# Patient Record
Sex: FEMALE | Race: BLACK OR AFRICAN AMERICAN | NOT HISPANIC OR LATINO | Employment: OTHER | URBAN - METROPOLITAN AREA
[De-identification: names, ages, dates, MRNs, and addresses within clinical notes are randomized per-mention and may not be internally consistent; named-entity substitution may affect disease eponyms.]

---

## 2017-01-03 ENCOUNTER — PATIENT MESSAGE (OUTPATIENT)
Dept: GASTROENTEROLOGY | Facility: CLINIC | Age: 57
End: 2017-01-03

## 2017-01-10 ENCOUNTER — OFFICE VISIT (OUTPATIENT)
Dept: ORTHOPEDICS | Facility: CLINIC | Age: 57
End: 2017-01-10
Payer: COMMERCIAL

## 2017-01-10 VITALS
DIASTOLIC BLOOD PRESSURE: 77 MMHG | HEART RATE: 62 BPM | BODY MASS INDEX: 22.73 KG/M2 | SYSTOLIC BLOOD PRESSURE: 158 MMHG | WEIGHT: 150 LBS | HEIGHT: 68 IN

## 2017-01-10 DIAGNOSIS — G89.29 CHRONIC PAIN OF BOTH SHOULDERS: Primary | ICD-10-CM

## 2017-01-10 DIAGNOSIS — M25.511 CHRONIC PAIN OF BOTH SHOULDERS: Primary | ICD-10-CM

## 2017-01-10 DIAGNOSIS — M25.512 CHRONIC PAIN OF BOTH SHOULDERS: Primary | ICD-10-CM

## 2017-01-10 PROCEDURE — 99999 PR PBB SHADOW E&M-EST. PATIENT-LVL III: CPT | Mod: PBBFAC,,, | Performed by: ORTHOPAEDIC SURGERY

## 2017-01-10 PROCEDURE — 3078F DIAST BP <80 MM HG: CPT | Mod: S$GLB,,, | Performed by: ORTHOPAEDIC SURGERY

## 2017-01-10 PROCEDURE — 1159F MED LIST DOCD IN RCRD: CPT | Mod: S$GLB,,, | Performed by: ORTHOPAEDIC SURGERY

## 2017-01-10 PROCEDURE — 3077F SYST BP >= 140 MM HG: CPT | Mod: S$GLB,,, | Performed by: ORTHOPAEDIC SURGERY

## 2017-01-10 PROCEDURE — 99203 OFFICE O/P NEW LOW 30 MIN: CPT | Mod: S$GLB,,, | Performed by: ORTHOPAEDIC SURGERY

## 2017-01-10 NOTE — PROGRESS NOTES
Past Medical History   Diagnosis Date    Arthritis     Hypertension     Thyroid disease        Past Surgical History   Procedure Laterality Date    Tubal ligation         Current Outpatient Prescriptions   Medication Sig    benazepril-hydrochlorthiazide (LOTENSIN HCT) 20-25 mg Tab Take 2 tablets by mouth once daily.    cyanocobalamin 1,000 mcg/mL injection Inject 1 mL (1,000 mcg total) into the skin As instructed. 1000 mcg (1 ml) subcutaneous injection daily x 5 days, then weekly x 4 weeks, then monthly thereafter (Patient taking differently: Inject 1,000 mcg into the skin every 30 days. )    ergocalciferol (VITAMIN D2) 50,000 unit Cap Take 1 capsule (50,000 Units total) by mouth every 14 (fourteen) days. Twice a month    etodolac (LODINE) 300 MG Cap Take 1 capsule (300 mg total) by mouth every 8 (eight) hours as needed.    levothyroxine (SYNTHROID) 150 MCG tablet Take 150 mcg by mouth once daily.     No current facility-administered medications for this visit.        Review of patient's allergies indicates:  No Known Allergies    Family History   Problem Relation Age of Onset    Hypertension Father     Stomach cancer Father     Diabetes Father     Diabetes Maternal Grandmother     Hypertension Maternal Grandmother     Stomach cancer Maternal Aunt     Stomach cancer Paternal Aunt     Breast cancer Paternal Aunt        Social History     Social History    Marital status:      Spouse name: N/A    Number of children: N/A    Years of education: N/A     Occupational History    Not on file.     Social History Main Topics    Smoking status: Never Smoker    Smokeless tobacco: Never Used    Alcohol use No    Drug use: No    Sexual activity: Yes     Partners: Male     Other Topics Concern    Not on file     Social History Narrative       Chief Complaint:   Chief Complaint   Patient presents with    Shoulder Pain     Bilateral shoulder pain       Consulting Physician: Referral,  "Self    History of present illness:    This is a 56 y.o. year old female who complains of bilateral shoulder pain that she localizes to her scapula.  She states that the shoulders themselves do not hurt.  She had a fall but says that this pain is not related to that.  She said she had the pain prior to the fall.  She reports that she has a neurological issue which causes her to have bilateral upper extremity weakness.    Review of Systems:    Constitution: Denies chills, fever, and sweats.  HENT: Denies headaches or blurry vision.  Cardiovascular: Denies chest pain or irregular heart beat.  Respiratory: Denies cough or shortness of breath.  Gastrointestinal: Denies abdominal pain, nausea, or vomiting.  Musculoskeletal:  Denies muscle cramps.  Neurological: Denies dizziness or focal weakness.  Psychiatric/Behavioral: Normal mental status.  Hematologic/Lymphatic: Denies bleeding problem or easy bruising/bleeding.  Skin: Denies rash or suspicious lesions.    Examination:    Vital Signs:    Vitals:    01/10/17 1503   BP: (!) 158/77   Pulse: 62   Weight: 68 kg (150 lb)   Height: 5' 8" (1.727 m)   PainSc:   6   PainLoc: Shoulder       Body mass index is 22.81 kg/(m^2).    This a well-developed, well nourished patient in no acute distress.    Alert and oriented and cooperative to examination.       Physical Exam: Right Shoulder Exam     Skin  Rash:   None  Scars:   None    Inspection/Observation  Swelling:   None  Erythema:   None  Bruising:   None  Wounds:   None  Scapular Winging:  None  Scapular Dyskinesia:  yes  Atrophy:   yes  Masses:   None  Lymphadenopathy: None    Tenderness   AC Joint:   None  Medial scapular border    Range of Motion   Active Forward Flexion:  30  Active External Rotation:  5  Active Internal Rotation: hip    Passive Forward Flexion:  180  Passive External Rotation: >45  Passive Internal Rotation at 90 degrees: Normal    Muscle Strength   Forward Flexion:  2/5  External Rotation: 2/5  Internal " Rotation:  3/5    Instability:  None    Other   Sensation:  Normal  Pulse:    2+ radial      Left Shoulder Exam     Skin  Rash:   None  Scars:   None    Inspection/Observation   Swelling:   None  Erythema:   None  Bruising:   None  Wounds:   None  Scapular Winging:  None  Scapular Dyskinesia:  yes  Atrophy:   yes  Masses:   None  Lymphadenopathy: None    Tenderness   AC Joint:   None  Medial scapular border    Range of Motion   Active Forward Flexion:  30  Active External Rotation:  5  Active Internal Rotation: Hip    Passive Forward Flexion:  180  Passive External Rotation: >45  Passive Internal Rotation at 90 degrees: Normal    Muscle Strength   Forward Flexion:  2/5  External Rotation: 2/5  Internal Rotation:  3/5    Instability:  None    Other   Sensation:  Normal  Pulse:    2+ radial          Imaging: X-rays ordered and reviewed today of both shoulders reveal some mild degenerative changes of the acromioclavicular joint but are otherwise normal.        Assessment: Chronic pain of both shoulders        Plan:  She has scapular dyskinesia and muscle atrophy related to some sort of neurological process.  She is currently being seen by a neurologist and has an appointment at [facility].  Her shoulders themselves do not actually her and I think that the lack of normal control of her scapula as well as causing her to have pain around the medial borders.  I've told her to keep her neurological appointments and if she needs any sort of assistance to please give us a call      DISCLAIMER: This note may have been dictated using voice recognition software and may contain grammatical errors.     NOTE: Consult report sent to referring provider via [employer].

## 2017-01-10 NOTE — MR AVS SNAPSHOT
[facility]  [place] [address]  Phone: [phone]                  Reta Styles   1/10/2017 3:15 PM   Office Visit    Description:  Female : 1960   Provider:  Rickie Contreras MD   Department:  [facility]           Reason for Visit     Shoulder Pain           Diagnoses this Visit        Comments    Chronic pain of both shoulders    -  Primary            To Do List           Future Appointments        Provider Department Dept Phone    2017 2:40 PM Cecile Wier MD [facility] [phone]      Goals (5 Years of Data)     None      [facility]     [facility] -  Assistance  Registered nurses in the [facility] provide clinical advisement, health education, appointment booking, and other advisory services.  Call for this free service at [phone].             Medications           Message regarding Medications     Verify the changes and/or additions to your medication regime listed below are the same as discussed with your clinician today.  If any of these changes or additions are incorrect, please notify your healthcare provider.             Verify that the below list of medications is an accurate representation of the medications you are currently taking.  If none reported, the list may be blank. If incorrect, please contact your healthcare provider. Carry this list with you in case of emergency.           Current Medications     benazepril-hydrochlorthiazide (LOTENSIN HCT) 20-25 mg Tab Take 2 tablets by mouth once daily.    cyanocobalamin 1,000 mcg/mL injection Inject 1 mL (1,000 mcg total) into the skin As instructed. 1000 mcg (1 ml) subcutaneous injection daily x 5 days, then weekly x 4 weeks, then monthly thereafter    ergocalciferol (VITAMIN D2) 50,000 unit Cap Take 1 capsule (50,000 Units total) by mouth every 14 (fourteen) days. Twice a month    etodolac (LODINE) 300 MG  "Cap Take 1 capsule (300 mg total) by mouth every 8 (eight) hours as needed.    levothyroxine (SYNTHROID) 150 MCG tablet Take 150 mcg by mouth once daily.           Clinical Reference Information           Vital Signs - Last Recorded  Most recent update: 1/10/2017  3:51 PM by Courtney Bills LPN    BP Pulse Ht Wt BMI    (!) 158/77 62 5' 8" (1.727 m) 68 kg (150 lb) 22.81 kg/m2      Blood Pressure          Most Recent Value    BP  (!)  158/77      Allergies as of 1/10/2017     No Known Allergies      Immunizations Administered on Date of Encounter - 1/10/2017     None      "

## 2017-04-17 ENCOUNTER — HISTORICAL (OUTPATIENT)
Dept: ADMINISTRATIVE | Facility: HOSPITAL | Age: 57
End: 2017-04-17

## 2017-06-07 ENCOUNTER — DOCUMENTATION ONLY (OUTPATIENT)
Dept: FAMILY MEDICINE | Facility: CLINIC | Age: 57
End: 2017-06-07

## 2017-06-07 NOTE — PROGRESS NOTES
Pre-Visit Chart Review  For Appointment Scheduled on 6/13/17.    Health Maintenance Due   Topic Date Due    Pap Smear with HPV Cotest  05/16/1979    Mammogram  05/02/2017

## 2017-06-13 ENCOUNTER — OFFICE VISIT (OUTPATIENT)
Dept: FAMILY MEDICINE | Facility: CLINIC | Age: 57
End: 2017-06-13
Payer: COMMERCIAL

## 2017-06-13 VITALS
HEIGHT: 68 IN | SYSTOLIC BLOOD PRESSURE: 132 MMHG | WEIGHT: 138.44 LBS | BODY MASS INDEX: 20.98 KG/M2 | TEMPERATURE: 98 F | HEART RATE: 73 BPM | DIASTOLIC BLOOD PRESSURE: 100 MMHG

## 2017-06-13 DIAGNOSIS — I10 ESSENTIAL HYPERTENSION: Primary | ICD-10-CM

## 2017-06-13 DIAGNOSIS — E21.5 PARATHYROID DISEASE: ICD-10-CM

## 2017-06-13 PROCEDURE — 99214 OFFICE O/P EST MOD 30 MIN: CPT | Mod: S$GLB,,, | Performed by: FAMILY MEDICINE

## 2017-06-13 PROCEDURE — 99999 PR PBB SHADOW E&M-EST. PATIENT-LVL III: CPT | Mod: PBBFAC,,, | Performed by: FAMILY MEDICINE

## 2017-06-13 RX ORDER — LEVOTHYROXINE SODIUM 150 MCG
150 TABLET ORAL
COMMUNITY
End: 2017-10-09 | Stop reason: DRUGHIGH

## 2017-06-13 RX ORDER — LEVOTHYROXINE SODIUM 175 UG/1
175 TABLET ORAL DAILY
COMMUNITY

## 2017-06-13 NOTE — PATIENT INSTRUCTIONS
Call Pinky (nurse) or Margarita ADRIAN) if we need to schedule you for pre-operative examination.   Call aft er surgery to schedule nurse BP check if BP not at goal (less than 140/90)        Controlling High Blood Pressure  High blood pressure (hypertension) is often called the silent killer. This is because many people who have it dont know it. High blood pressure is defined as 140/90 mm Hg or higher. Know your blood pressure and remember to check it regularly. Doing so can save your life. Here are some things you can do to help control your blood pressure.    Choose heart-healthy foods  · Select low-salt, low-fat foods. Limit sodium intake to 2,400 mg per day or the amount suggested by your healthcare provider.  · Limit canned, dried, cured, packaged, and fast foods. These can contain a lot of salt.  · Eat 8 to 10 servings of fruits and vegetables every day.  · Choose lean meats, fish, or chicken.  · Eat whole-grain pasta, brown rice, and beans.  · Eat 2 to 3 servings of low-fat or fat-free dairy products.  · Ask your doctor about the DASH eating plan. This plan helps reduce blood pressure.  · When you go to a restaurant, ask that your meal be prepared with no added salt.  Maintain a healthy weight  · Ask your healthcare provider how many calories to eat a day. Then stick to that number.  · Ask your healthcare provider what weight range is healthiest for you. If you are overweight, a weight loss of only 3% to 5% of your body weight can help lower blood pressure. Generally, a good weight loss goal is to lose 10% of your body weight in a year.  · Limit snacks and sweets.  · Get regular exercise.  Get up and get active  · Choose activities you enjoy. Find ones you can do with friends or family. This includes bicycling, dancing, walking, and jogging.  · Park farther away from building entrances.  · Use stairs instead of the elevator.  · When you can, walk or bike instead of driving.  · Davis leaves, garden, or do  household repairs.  · Be active at a moderate to vigorous level of physical activity for at least 40 minutes for a minimum of 3 to 4 days a week.   Manage stress  · Make time to relax and enjoy life. Find time to laugh.  · Communicate your concerns with your loved ones and your healthcare provider.  · Visit with family and friends, and keep up with hobbies.  Limit alcohol and quit smoking  · Men should have no more than 2 drinks per day.  · Women should have no more than 1 drink per day.  · Talk with your healthcare provider about quitting smoking. Smoking significantly increases your risk for heart disease and stroke. Ask your healthcare provider about community smoking cessation programs and other options.  Medicines  If lifestyle changes arent enough, your healthcare provider may prescribe high blood pressure medicine. Take all medicines as prescribed. If you have any questions about your medicines, ask your healthcare provider before stopping or changing them.   Date Last Reviewed: 4/27/2016  © 0985-0650 [employer] [address] [place] [address], [place] [address]. All rights reserved. This information is not intended as a substitute for professional medical care. Always follow your healthcare professional's instructions.

## 2017-06-13 NOTE — PROGRESS NOTES
CHIEF COMPLAINT:  Follow up    HISTORY OF PRESENT ILLNESS:  Reta Styles is a 57 y.o. female who presents to clinic for follow up.  She has hypothyroidism, vitamin D deficiency and follows up with Dr. Rahman. She was also found to have parathyroid disease and is meeting with Dr. Le at [facility] on Friday to discuss surgery.  She has HTN and is on lotensin HCT but states that she forgot to take her medication yesterday. She states that her blood pressure is usually in the 130/80-90s.       REVIEW OF SYSTEMS:  The patient denies any fever, chills, night sweats, headaches, vision changes, difficulty speaking or swallowing, decreased hearing, chest pain, palpitations, shortness of breath, cough, nausea, vomiting, abdominal pain, dysuria, diarrhea, constipation, hematuria, hematochezia, melena, changes in her hair, skin, nails, , erythema,swelling over any of her joints, myalgia, swollen glands, easy bruising, fatigue, edema    MEDICATIONS:   Reviewed and/or reconciled in EPIC    ALLERGIES:  Reviewed and/or reconciled in [facility]    PAST MEDICAL/SURGICAL HISTORY:   Past Medical History:   Diagnosis Date    Arthritis     Hypertension     Thyroid disease       Past Surgical History:   Procedure Laterality Date    TUBAL LIGATION         FAMILY HISTORY:    Family History   Problem Relation Age of Onset    Hypertension Father     Stomach cancer Father     Diabetes Father     Diabetes Maternal Grandmother     Hypertension Maternal Grandmother     Stomach cancer Maternal Aunt     Stomach cancer Paternal Aunt     Breast cancer Paternal Aunt        SOCIAL HISTORY:    Social History     Social History    Marital status:      Spouse name: N/A    Number of children: N/A    Years of education: N/A     Occupational History    Not on file.     Social History Main Topics    Smoking status: Never Smoker    Smokeless tobacco: Never Used    Alcohol use No    Drug use: No    Sexual activity: Yes     Partners:  "Male     Other Topics Concern    Not on file     Social History Narrative    No narrative on file       PHYSICAL EXAM:  VITAL SIGNS:   Vitals:    06/13/17 1435 06/13/17 1502   BP: (!) 148/103 (!) 132/100   BP Location: Right arm Right arm   Patient Position: Sitting Sitting   BP Method: Automatic Manual   Pulse: 73    Temp: 98 °F (36.7 °C)    TempSrc: Oral    Weight: 62.8 kg (138 lb 7.2 oz)    Height: 5' 8" (1.727 m)      GENERAL:  Patient appears well nourished, sitting on exam table, in no acute distress.  HEENT:  Atraumatic, normocephalic, PERRLA, EOMI, no conjunctival injection, sclerae are anicteric, normal external auditory canals,TMs clear b/l, gross hearing intact to whisper, MMM, no oropharygneal erythema or exudate.  NECK:  Supple, normal ROM, trachea is midline , no supraclavicular or cervical LAD or masses palpated.  Thyroid gland not palpable.  CARDIOVASCULAR:  RRR, normal S1 and S2, no m/r/g.  RESPIRATORY:  CTA b/l, no wheezes, rhonchi, rales.  No increased work of breathing, no  use of accessory muscles.  ABDOMEN:  Soft, nontender, nondistended, normoactive bowel sounds in all four quadrants, no rebound or guarding, no HSM or masses palpated.  Normal percussion.  EXTREMITIES:  2+ DP pulses b/l, no edema.  SKIN:  Warm, no lesions on exposed skin.  NEUROMUSCULAR:  Cranial nerves II-XII grossly intact. There is tenderness over paraspinal muscles in the thoracic region.  No clubbing or cyanosis of digits/nails.  Steady gait.  PSYCH:  Patient is alert and oriented to person, time, place. They are appropriately dressed and groomed. There is normal eye contact. Rate and tone of speech is normal. Normal insight, judgement. Normal thought content and process.       ASSESSMENT/PLAN: This is a 57 y.o. female who presents to clinic for evaluation of the following concerns  1.  HTN: see below  2. Parathyroid disease: follow up with Dr. Le she will let us know if she needs a preoperative " examination      Patient readiness: acceptance and barriers:none    During the course of the visit the patient was educated and counseled about the following:     Hypertension:   Discussed BP monitoring, will schedule nurse BP recheck after surgery if surgeon does not require stricter BP control.   Underweight:  See above    Goals: Hypertension: Reduce Blood Pressure and Underweight: Increase calorie intake and BMI      Did patient meet goals/outcomes: No    The following self management tools provided: declined    Patient Instructions (the written plan) was given to the patient/family.     Time spent with patient: 30 minutes        Cecile Weir MD

## 2017-06-22 ENCOUNTER — TELEPHONE (OUTPATIENT)
Dept: FAMILY MEDICINE | Facility: CLINIC | Age: 57
End: 2017-06-22

## 2017-06-22 NOTE — TELEPHONE ENCOUNTER
Patient has complaints of nausea stomach aches dizziness patient has been ignoring symptoms and is requesting blood pressure can be changed to see if her symptoms stop patient,patient does not have any swelling patient is on diuretic for blood pressure

## 2017-06-22 NOTE — TELEPHONE ENCOUNTER
----- Message from Davian Barba sent at 6/22/2017  8:14 AM CDT -----  Contact: same  Patient called in and requested a message be sent regarding changing her blood pressure meds.  Patient stated she is having some side affects.    Patient call back number is [phone]

## 2017-06-22 NOTE — TELEPHONE ENCOUNTER
So her lotensin-HCTZ is not a new medication, it is unlikely that it would be causing nausea now. How long have her symptoms been going on?  Are they there all the time?

## 2017-06-23 RX ORDER — AMLODIPINE BESYLATE 5 MG/1
5 TABLET ORAL DAILY
Qty: 30 TABLET | Refills: 11 | Status: SHIPPED | OUTPATIENT
Start: 2017-06-23 | End: 2017-07-06 | Stop reason: SDUPTHER

## 2017-06-23 NOTE — TELEPHONE ENCOUNTER
Stop lisinopril-HCTZ, start norvasc 5 mg daily. Call or e-mail if BP not at goal, less than 140/90, or she has any side effects.

## 2017-06-23 NOTE — TELEPHONE ENCOUNTER
Patient states she has been having nausea since starting medication and just ignored the symptoms ans figure it had nothing to do with the medication and decided to look up the side effect patient states the nausea,cough,dizziness is off and on.patient states she wants a new bp medication to see if her side effects stopped

## 2017-06-23 NOTE — TELEPHONE ENCOUNTER
----- Message from Marry Call sent at 6/23/2017  3:51 PM CDT -----  Call pt at [phone]  Asking for an update on a change for blood pressure medication

## 2017-07-03 ENCOUNTER — TELEPHONE (OUTPATIENT)
Dept: FAMILY MEDICINE | Facility: CLINIC | Age: 57
End: 2017-07-03

## 2017-07-03 RX ORDER — SCOLOPAMINE TRANSDERMAL SYSTEM 1 MG/1
1 PATCH, EXTENDED RELEASE TRANSDERMAL
Qty: 4 PATCH | Refills: 1 | Status: SHIPPED | OUTPATIENT
Start: 2017-07-03 | End: 2017-10-09

## 2017-07-03 NOTE — TELEPHONE ENCOUNTER
Received message from  in his My Chart account. They are going on a cruise and want to get patches for seasickness. Please advise. Thanks, Margarita

## 2017-07-06 ENCOUNTER — TELEPHONE (OUTPATIENT)
Dept: FAMILY MEDICINE | Facility: CLINIC | Age: 57
End: 2017-07-06

## 2017-07-06 RX ORDER — AMLODIPINE BESYLATE 10 MG/1
10 TABLET ORAL DAILY
Qty: 30 TABLET | Refills: 11 | Status: SHIPPED | OUTPATIENT
Start: 2017-07-06 | End: 2017-09-21 | Stop reason: SDUPTHER

## 2017-07-06 NOTE — TELEPHONE ENCOUNTER
Patient states he bp has been running higher than 140/90 patient does not ,patient states the medication has helped a lot with her dizziness and nausea.

## 2017-07-06 NOTE — TELEPHONE ENCOUNTER
----- Message from Susannah Marroquin sent at 7/6/2017  9:13 AM CDT -----  Patient states amlodipine has her BP readings at 145/92, please call to ramon, [phone]

## 2017-07-20 DIAGNOSIS — Z12.31 OTHER SCREENING MAMMOGRAM: ICD-10-CM

## 2017-07-21 ENCOUNTER — TELEPHONE (OUTPATIENT)
Dept: FAMILY MEDICINE | Facility: CLINIC | Age: 57
End: 2017-07-21

## 2017-07-21 ENCOUNTER — CLINICAL SUPPORT (OUTPATIENT)
Dept: FAMILY MEDICINE | Facility: CLINIC | Age: 57
End: 2017-07-21
Payer: COMMERCIAL

## 2017-07-21 VITALS — SYSTOLIC BLOOD PRESSURE: 150 MMHG | DIASTOLIC BLOOD PRESSURE: 98 MMHG

## 2017-07-21 RX ORDER — VALSARTAN 80 MG/1
80 TABLET ORAL DAILY
Qty: 90 TABLET | Refills: 3 | Status: SHIPPED | OUTPATIENT
Start: 2017-07-21 | End: 2018-09-12 | Stop reason: CLARIF

## 2017-07-21 NOTE — PROGRESS NOTES
Patient came to clinic for bp check,automatic 145/100 pulse 82,  Pt machine 153/106 pulse 85,manual 150/98. Takes medication as prescribed. Home readings  7/7  149/91  7/8  134/91  7/9 143/90  7/10 145/91   7/12 139/96 7/18 137/92 7/19 137/90.

## 2017-07-21 NOTE — TELEPHONE ENCOUNTER
BP not at goal. Needs to have second medication added. Am adding diovan, needs to monitor BP and have clinic nurse check in 3 weeks

## 2017-07-21 NOTE — TELEPHONE ENCOUNTER
Notified patient of message. Patient is having surgery in a few weeks and will call after surgery to schedule bp check.

## 2017-09-21 RX ORDER — AMLODIPINE BESYLATE 10 MG/1
10 TABLET ORAL DAILY
Qty: 90 TABLET | Refills: 3 | Status: SHIPPED | OUTPATIENT
Start: 2017-09-21 | End: 2017-10-09

## 2017-10-04 ENCOUNTER — DOCUMENTATION ONLY (OUTPATIENT)
Dept: FAMILY MEDICINE | Facility: CLINIC | Age: 57
End: 2017-10-04

## 2017-10-04 NOTE — PROGRESS NOTES
Pre-Visit Chart Review  For Appointment Scheduled on 10/9/17.    Health Maintenance Due   Topic Date Due    Pap Smear with HPV Cotest  05/16/1979    Mammogram  05/02/2017    Influenza Vaccine  08/01/2017

## 2017-10-09 ENCOUNTER — OFFICE VISIT (OUTPATIENT)
Dept: FAMILY MEDICINE | Facility: CLINIC | Age: 57
End: 2017-10-09
Payer: COMMERCIAL

## 2017-10-09 VITALS
HEART RATE: 84 BPM | BODY MASS INDEX: 20.11 KG/M2 | DIASTOLIC BLOOD PRESSURE: 86 MMHG | WEIGHT: 132.69 LBS | SYSTOLIC BLOOD PRESSURE: 130 MMHG | TEMPERATURE: 98 F | HEIGHT: 68 IN

## 2017-10-09 DIAGNOSIS — R10.13 EPIGASTRIC ABDOMINAL PAIN: Primary | ICD-10-CM

## 2017-10-09 DIAGNOSIS — R14.0 ABDOMINAL BLOATING: ICD-10-CM

## 2017-10-09 DIAGNOSIS — I10 ESSENTIAL HYPERTENSION: ICD-10-CM

## 2017-10-09 DIAGNOSIS — R63.4 WEIGHT LOSS: ICD-10-CM

## 2017-10-09 PROBLEM — N20.0 NEPHROLITHIASIS: Status: ACTIVE | Noted: 2017-10-09

## 2017-10-09 PROCEDURE — 99999 PR PBB SHADOW E&M-EST. PATIENT-LVL IV: CPT | Mod: PBBFAC,,, | Performed by: FAMILY MEDICINE

## 2017-10-09 PROCEDURE — 99214 OFFICE O/P EST MOD 30 MIN: CPT | Mod: S$GLB,,, | Performed by: FAMILY MEDICINE

## 2017-10-09 RX ORDER — VITAMIN E (DL,TOCOPHERYL ACET) 45 MG/0.25
1 DROPS ORAL 3 TIMES DAILY
COMMUNITY
End: 2018-09-12 | Stop reason: CLARIF

## 2017-10-09 RX ORDER — DICYCLOMINE HYDROCHLORIDE 10 MG/1
10 CAPSULE ORAL
Qty: 120 CAPSULE | Refills: 0 | Status: SHIPPED | OUTPATIENT
Start: 2017-10-09 | End: 2017-11-05 | Stop reason: SDUPTHER

## 2017-10-09 RX ORDER — PANTOPRAZOLE SODIUM 40 MG/1
40 TABLET, DELAYED RELEASE ORAL DAILY
Refills: 0 | COMMUNITY
Start: 2017-10-04 | End: 2018-09-12 | Stop reason: CLARIF

## 2017-10-09 RX ORDER — CALCITRIOL 0.25 UG/1
0.25 CAPSULE ORAL 2 TIMES DAILY
COMMUNITY

## 2017-10-09 NOTE — PROGRESS NOTES
CHIEF COMPLAINT:  Abdominal pain and bloating      HISTORY OF PRESENT ILLNESS:  Reta Styles is a 57 y.o. female who presents to clinic for evaluation of abdominal pain and bloating. She states that since August 10 2017 when she had parathyroid surgery she has had epigastric abdominal pain, abdominal bloating and a sensation that her abdomen feels tight. This is associated with some nausea. She denies any vomiting. She denies any changes in her stool. The pain was severe enough last week that she presented to [facility] ER and was admitted for evaluation. I do not have these records available to me today but she states that she had a negative cardiac evaluation. She was started on protonix with slight improvement in her symptoms. She underwent EGD 9 months ago which demonstrated gastritis and gastric polyps. When the pain is severe she has difficulty walking.      REVIEW OF SYSTEMS:  The patient denies any fever, chills, night sweats, headaches, vision changes, difficulty speaking or swallowing, decreased hearing, weight loss, weight gain, chest pain, palpitations, shortness of breath, cough, , vomiting,  dysuria, diarrhea, constipation, hematuria, hematochezia, melena, changes in her hair, skin, nails, , erythema,  swelling over any of her joints, myalgia, swollen glands, easy bruising, fatigue, edema, symptoms of anxiety or depression.       MEDICATIONS:   Reviewed and/or reconciled in EPIC    ALLERGIES:  Reviewed and/or reconciled in [facility]    PAST MEDICAL/SURGICAL HISTORY:   Past Medical History:   Diagnosis Date    Arthritis     Hypertension     Hypoparathyroidism     Dr. Rahman    Thyroid disease       Past Surgical History:   Procedure Laterality Date    parathyroid removal      TUBAL LIGATION         FAMILY HISTORY:    Family History   Problem Relation Age of Onset    Hypertension Father     Stomach cancer Father     Diabetes Father     Diabetes Maternal Grandmother     Hypertension Maternal  "Grandmother     Stomach cancer Maternal Aunt     Stomach cancer Paternal Aunt     Breast cancer Paternal Aunt        SOCIAL HISTORY:    Social History     Social History    Marital status:      Spouse name: N/A    Number of children: N/A    Years of education: N/A     Occupational History    Not on file.     Social History Main Topics    Smoking status: Never Smoker    Smokeless tobacco: Never Used    Alcohol use No    Drug use: No    Sexual activity: Yes     Partners: Male     Other Topics Concern    Not on file     Social History Narrative    No narrative on file       PHYSICAL EXAM:  VITAL SIGNS:   Vitals:    10/09/17 1046 10/09/17 1131   BP: (!) 143/97 130/86   BP Location: Right arm Right arm   Patient Position: Sitting Sitting   BP Method: Small (Automatic) Small (Manual)   Pulse: 84    Temp: 98 °F (36.7 °C)    TempSrc: Oral    Weight: 60.2 kg (132 lb 11.5 oz)    Height: 5' 8" (1.727 m)      GENERAL:  Patient appears well nourished, sitting in wheelchair in no acute distress.  HEENT:  Atraumatic, normocephalic, PERRLA, EOMI, no conjunctival injection, sclerae are anicteric, normal external auditory canals,TMs clear b/l, gross hearing intact to whisper, MMM, no oropharygneal erythema or exudate.  NECK:  Supple, normal ROM, trachea is midline , no supraclavicular or cervical LAD or masses palpated.  Thyroid gland not palpable.  CARDIOVASCULAR:  RRR, normal S1 and S2, no m/r/g.  RESPIRATORY:  CTA b/l, no wheezes, rhonchi, rales.  No increased work of breathing, no  use of accessory muscles.  ABDOMEN:  Soft, nontender, nondistended, normoactive bowel sounds in all four quadrants, no rebound or guarding, no HSM or masses palpated.  Normal percussion.  EXTREMITIES:  2+ DP pulses b/l, no edema.  SKIN:  Warm, no lesions on exposed skin.  NEUROMUSCULAR:  Cranial nerves II-XII grossly intact. No clubbing or cyanosis of digits/nails.  Steady gait.  PSYCH:  Patient is alert and oriented to person, " time, place. They are appropriately dressed and groomed. There is normal eye contact. Rate and tone of speech is normal. Normal insight, judgement. Normal thought content and process.         ASSESSMENT/PLAN: This is a 57 y.o. female who presents to clinic for evaluation of the following symptoms.   1. Epigastric abdominal pain, bloating, weight loss  Continue with protonix, place on trial of bentyl, will obtain medical records from [facility]  - Ambulatory referral to Gastroenterology    2. Essential hypertension  See below        Patient readiness: acceptance and barriers:none    During the course of the visit the patient was educated and counseled about the following:     Hypertension:   Medication: no change.    Goals: Hypertension: Reduce Blood Pressure    Did patient meet goals/outcomes: Yes    The following self management tools provided: declined    Patient Instructions (the written plan) was given to the patient/family.     Time spent with patient: 30 minutes        Cecile Weir MD

## 2017-10-13 ENCOUNTER — TELEPHONE (OUTPATIENT)
Dept: FAMILY MEDICINE | Facility: CLINIC | Age: 57
End: 2017-10-13

## 2017-10-13 NOTE — TELEPHONE ENCOUNTER
Spoke to patient home health nurse theresa with [facility].  States patient has small bm x2 hard stool.  abd firm and distended.  Requesting laxative as needed otc such as dulcolax.  And daily colace otc   I can give verbal if you are ok with this

## 2017-10-13 NOTE — TELEPHONE ENCOUNTER
----- Message from Nomey Lazo sent at 10/13/2017  9:14 AM CDT -----  Contact: theresa [facility] [phone]  Theresa called from [facility] she would like to know if the patient can take an otc Laxative. Please call b [phone]

## 2017-10-25 ENCOUNTER — INITIAL CONSULT (OUTPATIENT)
Dept: GASTROENTEROLOGY | Facility: CLINIC | Age: 57
End: 2017-10-25
Payer: COMMERCIAL

## 2017-10-25 VITALS
HEART RATE: 85 BPM | HEIGHT: 68 IN | BODY MASS INDEX: 19.91 KG/M2 | WEIGHT: 131.38 LBS | RESPIRATION RATE: 20 BRPM | SYSTOLIC BLOOD PRESSURE: 141 MMHG | DIASTOLIC BLOOD PRESSURE: 88 MMHG

## 2017-10-25 DIAGNOSIS — R10.9 ABDOMINAL PRESSURE: Primary | ICD-10-CM

## 2017-10-25 DIAGNOSIS — R63.4 WEIGHT LOSS: ICD-10-CM

## 2017-10-25 PROCEDURE — 99999 PR PBB SHADOW E&M-EST. PATIENT-LVL III: CPT | Mod: PBBFAC,,, | Performed by: INTERNAL MEDICINE

## 2017-10-25 PROCEDURE — 99214 OFFICE O/P EST MOD 30 MIN: CPT | Mod: S$GLB,,, | Performed by: INTERNAL MEDICINE

## 2017-10-25 NOTE — LETTER
October 25, 2017      Cecile Weir MD  [address]  [place] [address]           [place] [facility]  [address]  [place] [address]  Phone: [phone]          Patient: Reta Styles   MR Number: [id number]   YOB: 1960   Date of Visit: 10/25/2017       Dear Dr. Cecile Weir:    Thank you for referring Reta Styles to me for evaluation. Attached you will find relevant portions of my assessment and plan of care.    If you have questions, please do not hesitate to call me. I look forward to following Reta Styles along with you.    Sincerely,    Olesya Cruz MD    Enclosure  CC:  No Recipients    If you would like to receive this communication electronically, please contact [email or web address] or [phone] to request more information on [employer] access.    For providers and/or their staff who would like to refer a patient to [facility], please contact us through our one-stop-shop provider referral line, [facility], at [phone].    If you feel you have received this communication in error or would no longer like to receive these types of communications, please e-mail [email or web address]

## 2017-10-25 NOTE — PROGRESS NOTES
"[facility]    CC: Abdominal Pressure    HPI 57 y.o. female with increasing muscle weakness/neuropathy now in wheelchair barely able to move against gravity, presents with intermittent, severe, upper abdominal/sub-xiphoid pressure that is not necessarily associated with PO intake or change in position, though laying back "relaxing" improves symptoms. She has had extensive work-up at [facility] including imaging and cardiac evaluation, which she states were unrevealing. She also underwent EGD early this year which did not reveal etiology of pain. Unfortunately her neurologic symptoms continue to progress. She has a history of constipation that has improved recently, now having a BM every 1-2 days. She has also had significant weight loss with decreased appetite and occasional abdominal bloating. She recently began Ensure Enlive supplements.     Past Medical History:   Diagnosis Date    Arthritis     Hypertension     Hypoparathyroidism     Dr. Rahman    Thyroid disease          Review of Systems  General ROS: negative for - chills, fever; + weight loss  Cardiovascular ROS: no chest pain or dyspnea on exertion  Gastrointestinal ROS: + upper abdominal pressure, no vomiting, resolving constipation    Physical Examination  BP (!) 141/88   Pulse 85   Resp 20   Ht 5' 8" (1.727 m)   Wt 59.6 kg (131 lb 6.3 oz)   BMI 19.98 kg/m²   General appearance: alert, cooperative, no distress  HENT: Normocephalic, atraumatic, neck symmetrical, no nasal discharge, sclera anicteric   Lungs: clear to auscultation bilaterally, symmetric chest wall expansion bilaterally  Heart: regular rate and rhythm without rub; no displacement of the PMI   Abdomen: soft, no organomegaly appreciated, mild distention, BS normoactive, negative Carnett sign (though patient unable to raise legs thus passively raised by daughter)- initial tenderness over xiphoid process however this resolved with repeated palpation  Extremities: extremities " symmetric; no clubbing, cyanosis, or edema; diffuse weakness , barely         Labs:  Lab Results   Component Value Date    WBC 7.33 12/13/2016    HGB 14.2 12/13/2016    HCT 42.0 12/13/2016    MCV 93 12/13/2016     (H) 12/13/2016         Imaging:  MRI brain was independently visualized and reviewed by me and showed small white matter changes    Assessment:   57 y.o. female with diffuse, progressive muscular weakness possibly related to parathyroid dysfunction now s/p parathyroidectomy without improvement in symptoms, presents with chronic, intermittent, squeezing upper abdominal/sub-xiphoid pressure worse with certain movements. ? MSK vs neuropathic vs anxiety (though less likely), ? Diagnosis of multiple sclerosis which can cause intermittent pain such as this ? Paraneoplastic syndrome- patient followed by Dr. Marley with [facility], has follow up in December    Plan:  -Obtain and review imaging records from [facility] (order chest imaging if not done)  -Trial of Salon Pas to subcostal area  -Refer to nutritional support    Olesya Cruz MD  [facility]  [address], [address]  [place] [address]  Office: [phone]  Fax: [phone]

## 2017-11-01 ENCOUNTER — TELEPHONE (OUTPATIENT)
Dept: FAMILY MEDICINE | Facility: CLINIC | Age: 57
End: 2017-11-01

## 2017-11-01 NOTE — TELEPHONE ENCOUNTER
Patient has been called and advised of the correct fax number for them t send her paper work to , she has verbalized full understanding.

## 2017-11-01 NOTE — TELEPHONE ENCOUNTER
----- Message from Selina Cuevas sent at 10/30/2017  2:25 PM CDT -----  Contact: patient  Contact patient regarding information sent on her disability contact patient [phone].    Thank you

## 2017-11-06 RX ORDER — DICYCLOMINE HYDROCHLORIDE 10 MG/1
10 CAPSULE ORAL
Qty: 120 CAPSULE | Refills: 0 | Status: SHIPPED | OUTPATIENT
Start: 2017-11-06 | End: 2017-12-06

## 2017-11-27 ENCOUNTER — CLINICAL SUPPORT (OUTPATIENT)
Dept: URGENT CARE | Facility: CLINIC | Age: 57
End: 2017-11-27

## 2017-11-27 DIAGNOSIS — Z02.71 ENCOUNTER FOR DISABILITY EXAMINATION: Primary | ICD-10-CM

## 2017-11-27 DIAGNOSIS — Z00.00 PHYSICAL EXAM: ICD-10-CM

## 2017-11-27 PROCEDURE — 99456 DISABILITY EXAMINATION: CPT | Mod: S$GLB,,,

## 2017-11-27 PROCEDURE — 99199 UNLISTED SPECIAL SVC PX/RPRT: CPT | Mod: S$GLB,,,

## 2018-03-05 ENCOUNTER — TELEPHONE (OUTPATIENT)
Dept: FAMILY MEDICINE | Facility: CLINIC | Age: 58
End: 2018-03-05

## 2018-03-05 ENCOUNTER — DOCUMENTATION ONLY (OUTPATIENT)
Dept: FAMILY MEDICINE | Facility: CLINIC | Age: 58
End: 2018-03-05

## 2018-03-05 NOTE — PROGRESS NOTES
Pre-Visit Chart Review  For Appointment Scheduled on 03/06/2018    Health Maintenance Due   Topic Date Due    Pap Smear with HPV Cotest  05/16/1979    Mammogram  05/02/2016

## 2018-03-05 NOTE — TELEPHONE ENCOUNTER
----- Message from Joyce Shook sent at 3/5/2018  9:27 AM CST -----    Calling to  Get seen  Today , pt  Had  Pneumonia  Two  Months  Ago,  And recovered ,  Pt   Has a  Cough  And  Is  Spitting  Up   A little mucus ,clear// but  Thick , no  Fever // whats to make  Sure  Pt  Is  Not  Going into  Pneumonia  Again //[phone]// please call

## 2018-03-06 ENCOUNTER — LAB VISIT (OUTPATIENT)
Dept: LAB | Facility: HOSPITAL | Age: 58
End: 2018-03-06
Attending: PHYSICIAN ASSISTANT
Payer: COMMERCIAL

## 2018-03-06 ENCOUNTER — OFFICE VISIT (OUTPATIENT)
Dept: FAMILY MEDICINE | Facility: CLINIC | Age: 58
End: 2018-03-06
Payer: COMMERCIAL

## 2018-03-06 ENCOUNTER — HOSPITAL ENCOUNTER (OUTPATIENT)
Dept: RADIOLOGY | Facility: CLINIC | Age: 58
Discharge: HOME | End: 2018-03-06
Attending: PHYSICIAN ASSISTANT
Payer: COMMERCIAL

## 2018-03-06 VITALS
SYSTOLIC BLOOD PRESSURE: 152 MMHG | WEIGHT: 132.25 LBS | TEMPERATURE: 98 F | DIASTOLIC BLOOD PRESSURE: 98 MMHG | HEART RATE: 87 BPM | HEIGHT: 68 IN | BODY MASS INDEX: 20.04 KG/M2 | OXYGEN SATURATION: 95 %

## 2018-03-06 DIAGNOSIS — R05.9 COUGH: ICD-10-CM

## 2018-03-06 DIAGNOSIS — R06.02 SOB (SHORTNESS OF BREATH): ICD-10-CM

## 2018-03-06 DIAGNOSIS — I51.7 CARDIOMEGALY: ICD-10-CM

## 2018-03-06 DIAGNOSIS — R06.02 SOB (SHORTNESS OF BREATH): Primary | ICD-10-CM

## 2018-03-06 DIAGNOSIS — J20.9 ACUTE BRONCHITIS, UNSPECIFIED ORGANISM: ICD-10-CM

## 2018-03-06 LAB
ANION GAP SERPL CALC-SCNC: 12 MMOL/L
BASOPHILS # BLD AUTO: 0 K/UL
BASOPHILS NFR BLD: 0.3 %
BNP SERPL-MCNC: 11 PG/ML
BUN SERPL-MCNC: 14 MG/DL
CALCIUM SERPL-MCNC: 9.1 MG/DL
CHLORIDE SERPL-SCNC: 100 MMOL/L
CO2 SERPL-SCNC: 30 MMOL/L
CREAT SERPL-MCNC: 0.5 MG/DL
D DIMER PPP IA.FEU-MCNC: 0.32 MG/L FEU
DIFFERENTIAL METHOD: ABNORMAL
EOSINOPHIL # BLD AUTO: 0 K/UL
EOSINOPHIL NFR BLD: 0.1 %
ERYTHROCYTE [DISTWIDTH] IN BLOOD BY AUTOMATED COUNT: 15.1 %
EST. GFR  (AFRICAN AMERICAN): >60 ML/MIN/1.73 M^2
EST. GFR  (NON AFRICAN AMERICAN): >60 ML/MIN/1.73 M^2
GLUCOSE SERPL-MCNC: 106 MG/DL
HCT VFR BLD AUTO: 45.9 %
HGB BLD-MCNC: 15.2 G/DL
LYMPHOCYTES # BLD AUTO: 0.6 K/UL
LYMPHOCYTES NFR BLD: 5.5 %
MCH RBC QN AUTO: 31.6 PG
MCHC RBC AUTO-ENTMCNC: 33.1 G/DL
MCV RBC AUTO: 95 FL
MONOCYTES # BLD AUTO: 0.1 K/UL
MONOCYTES NFR BLD: 1 %
NEUTROPHILS # BLD AUTO: 10.2 K/UL
NEUTROPHILS NFR BLD: 93.1 %
PLATELET # BLD AUTO: 347 K/UL
PMV BLD AUTO: 7.9 FL
POTASSIUM SERPL-SCNC: 3.7 MMOL/L
RBC # BLD AUTO: 4.81 M/UL
SODIUM SERPL-SCNC: 142 MMOL/L
WBC # BLD AUTO: 10.9 K/UL

## 2018-03-06 PROCEDURE — 3080F DIAST BP >= 90 MM HG: CPT | Mod: S$GLB,,, | Performed by: PHYSICIAN ASSISTANT

## 2018-03-06 PROCEDURE — 36415 COLL VENOUS BLD VENIPUNCTURE: CPT

## 2018-03-06 PROCEDURE — 80048 BASIC METABOLIC PNL TOTAL CA: CPT

## 2018-03-06 PROCEDURE — 99214 OFFICE O/P EST MOD 30 MIN: CPT | Mod: S$GLB,,, | Performed by: PHYSICIAN ASSISTANT

## 2018-03-06 PROCEDURE — 99999 PR PBB SHADOW E&M-EST. PATIENT-LVL IV: CPT | Mod: PBBFAC,,, | Performed by: PHYSICIAN ASSISTANT

## 2018-03-06 PROCEDURE — 83880 ASSAY OF NATRIURETIC PEPTIDE: CPT

## 2018-03-06 PROCEDURE — 71045 X-RAY EXAM CHEST 1 VIEW: CPT | Mod: TC,FY,PO

## 2018-03-06 PROCEDURE — 3077F SYST BP >= 140 MM HG: CPT | Mod: S$GLB,,, | Performed by: PHYSICIAN ASSISTANT

## 2018-03-06 PROCEDURE — 71045 X-RAY EXAM CHEST 1 VIEW: CPT | Mod: 26,,, | Performed by: RADIOLOGY

## 2018-03-06 PROCEDURE — 85379 FIBRIN DEGRADATION QUANT: CPT

## 2018-03-06 PROCEDURE — 85025 COMPLETE CBC W/AUTO DIFF WBC: CPT

## 2018-03-06 RX ORDER — LIOTHYRONINE SODIUM 5 UG/1
25 TABLET ORAL DAILY
COMMUNITY
End: 2018-09-12 | Stop reason: CLARIF

## 2018-03-06 RX ORDER — ALBUTEROL SULFATE 90 UG/1
2 AEROSOL, METERED RESPIRATORY (INHALATION) EVERY 6 HOURS PRN
Qty: 1 INHALER | Refills: 0 | Status: SHIPPED | OUTPATIENT
Start: 2018-03-06 | End: 2018-09-12 | Stop reason: CLARIF

## 2018-03-06 RX ORDER — DOXYCYCLINE 100 MG/1
100 CAPSULE ORAL EVERY 12 HOURS
Qty: 20 CAPSULE | Refills: 0 | Status: SHIPPED | OUTPATIENT
Start: 2018-03-06 | End: 2018-09-12 | Stop reason: CLARIF

## 2018-03-06 RX ORDER — PREDNISONE 20 MG/1
40 TABLET ORAL DAILY
COMMUNITY

## 2018-03-06 NOTE — PROGRESS NOTES
Subjective:       Patient ID: Reta Styles is a 57 y.o. female.    Chief Complaint: Cough and Chest Congestion    Patient with paralysis of unknown etiology currently followed [facility] presents for evaluation of cough and shortness of breath X 1 week.  She explains that she was hospitalized 2 months ago for pneumonia.  She explains that she has decreased ability to cough due to her paralysis.  She is having poor appetite.  She is feeling lethargic.  She has had slight nasal congestion.   She denies fever.        Cough   This is a new problem. The current episode started in the past 7 days. The problem has been gradually worsening. The problem occurs constantly. The cough is non-productive. Associated symptoms include chest pain, myalgias and shortness of breath. Pertinent negatives include no ear congestion, ear pain, fever, headaches, nasal congestion, postnasal drip, sore throat or sweats. She has tried nothing for the symptoms. Her past medical history is significant for pneumonia.     Review of Systems   Constitutional: Negative for fever.   HENT: Negative for ear pain, postnasal drip and sore throat.    Respiratory: Positive for cough and shortness of breath.    Cardiovascular: Positive for chest pain.   Musculoskeletal: Positive for myalgias.   Neurological: Negative for headaches.       Objective:      Physical Exam   Constitutional: She appears well-developed and well-nourished. She is cooperative. No distress.   HENT:   Head: Normocephalic and atraumatic.   Eyes: Conjunctivae and EOM are normal. Pupils are equal, round, and reactive to light. No scleral icterus.   Cardiovascular: Normal rate, regular rhythm and normal heart sounds.    Pulmonary/Chest: Effort normal and breath sounds normal.   Abdominal: Soft. Bowel sounds are normal.   Musculoskeletal:        Right lower leg: She exhibits no edema.        Left lower leg: She exhibits no edema.   Neurological: She is alert.   Psychiatric: She  has a normal mood and affect. Her speech is normal. Judgment normal. Cognition and memory are normal.   Vitals reviewed.      Assessment:       1. SOB (shortness of breath)    2. Cough    3. Cardiomegaly    4. Acute bronchitis, unspecified organism        Plan:     Reta was seen today for cough and chest congestion.    Diagnoses and all orders for this visit:    SOB (shortness of breath)  X-Ray Chest PA And Lateral; Future  -     Brain natriuretic peptide; Future  -     D dimer, quantitative; Future    Cough   X-Ray Chest PA And Lateral; Future    Cardiomegaly  -     Brain natriuretic peptide; Future  -     CBC auto differential; Future  -     Basic metabolic panel; Future    Acute bronchitis, unspecified organism  -     doxycycline (VIBRAMYCIN) 100 MG Cap; Take 1 capsule (100 mg total) by mouth every 12 (twelve) hours.  -     albuterol 90 mcg/actuation inhaler; Inhale 2 puffs into the lungs every 6 (six) hours as needed.    Further recommendations will be made based on results   Follow-up for lab today at [facility],  1 week follow up me .  Seek ER for worsening symptoms

## 2018-03-15 DIAGNOSIS — R06.02 SHORTNESS OF BREATH: Primary | ICD-10-CM

## 2018-03-16 ENCOUNTER — TELEPHONE (OUTPATIENT)
Dept: NEUROLOGY | Facility: CLINIC | Age: 58
End: 2018-03-16

## 2018-03-16 NOTE — TELEPHONE ENCOUNTER
----- Message from Erlinda Mancilla sent at 3/15/2018  1:16 PM CDT -----  Contact: pt @ [phone]  Calling to schedule an appt with Dr. Harkins. Please call.

## 2018-03-16 NOTE — TELEPHONE ENCOUNTER
Pt stated she was busy and would call the dept back to schedule an appt for a Neuromuscular consult.

## 2018-03-20 ENCOUNTER — HOSPITAL ENCOUNTER (OUTPATIENT)
Dept: RESPIRATORY THERAPY | Facility: HOSPITAL | Age: 58
Discharge: HOME | End: 2018-03-20
Attending: INTERNAL MEDICINE
Payer: COMMERCIAL

## 2018-03-20 DIAGNOSIS — R06.02 SHORTNESS OF BREATH: ICD-10-CM

## 2018-03-20 PROCEDURE — 94729 DIFFUSING CAPACITY: CPT | Mod: 26,,, | Performed by: INTERNAL MEDICINE

## 2018-03-20 PROCEDURE — 94060 EVALUATION OF WHEEZING: CPT

## 2018-03-20 PROCEDURE — 94727 GAS DIL/WSHOT DETER LNG VOL: CPT

## 2018-03-20 PROCEDURE — 94060 EVALUATION OF WHEEZING: CPT | Mod: 26,,, | Performed by: INTERNAL MEDICINE

## 2018-03-20 PROCEDURE — 94726 PLETHYSMOGRAPHY LUNG VOLUMES: CPT | Mod: 26,,, | Performed by: INTERNAL MEDICINE

## 2018-03-20 PROCEDURE — 94729 DIFFUSING CAPACITY: CPT

## 2018-03-21 NOTE — PROCEDURES
Pulmonary Functions, including spirometry and bronchodilator response   and diffusion, study was done March 20, 2018.  Spirometry shows loss of vital capacity and fev1 with no obstruction and no bronchodilator response.   FEV1 is 40% or 1.00 liters.     Diffusion shows reduced but falls within normal range when corrected for lung volumes.    Pulmonary functions show low forced vital capacity at 37% predicted. Would consider restriction or severe air trapping. Clinical correlation recommended.     Pravin Petersen M.D.

## 2018-04-06 ENCOUNTER — TELEPHONE (OUTPATIENT)
Dept: NEUROLOGY | Facility: CLINIC | Age: 58
End: 2018-04-06

## 2018-04-06 NOTE — TELEPHONE ENCOUNTER
Left a message for the patient to let her know an appointment is scheduled for her on 5/17. The patient was asked to call back to confirm or reschedule if this is not a good day and time for her.

## 2018-05-16 NOTE — PROGRESS NOTES
Patient Name: Reta Styles  MRN: [id number]    CC: Progressive weakness    HPI: Reta Styles is a 58 y.o. female with onset right arm weakness beginning in the Feb 2016, followed by progressive right arm weakness. She noticed leg weakness in the second half of 2016, which resulted in a traumatic fall in October - chin trauma/stitches. Noticed left arm weakness beginning in November of 2016. She stopped working in March of 2017 when she could no longer  a phone. She has progressive lower extremity weakness, leading to walking with walker, and is now WC bound since Feb 2018.     She was seen by Dr Daniels in June of 2016. Work up included MRI brain and Cspine, as well as labs. Some WM lesions on brain imaging, concerning for possible MS. Cspine with moderate, bilateral NFS at C3-4, 4-5 and 6-7. No CCS. Labs were unremarkable. She was lost to follow up after initial workup.     [facility] by [facility] in April of 2017. Workup at [facility] included an unremarkable CSF study, nerve biopsy (results below), and labs (outlined below).     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 February 2018 clinic note from [facility] documents the patient's impression that her symptoms had improved in UE. Based on her response, she was diagnosed with mononeuritis multiplex and the prednisone was increased to 40mg/d.  However, she couldn't tolerate that dose (insomnia, anorexia) and had to return to the original script. At 20mg she can sleep (some), but does not think that she has received any benefit.     Has intermittent SOB when sitting.   Has difficulty swallowing - had swallow study at [facility] and was told to perform chin tucks.   She has a hospital bed and sleeps at an angle d/t orthopnea for about a year.  +muscle cramping in hands/toes  +weight loss - 40lbs over two years    Was diagnosed with hyperparathyroidism and is s/p  parathyroid resection Aug of 2017.     Labs: 8420-5115  B12, low at 153, put on IM replacement.  SSA/B negative  SPEP negative  Lyme and RF negative   Hep C negative  LUCITA negative  RF negative    PFTs 3/20/18: FVC 3.15  Negative LE U/S 3/20/18      Review of Systems   Constitutional: Positive for appetite change and unexpected weight change.   HENT: Positive for trouble swallowing.    Eyes: Negative for visual disturbance.   Respiratory: Positive for shortness of breath.    Cardiovascular:        Bilateral feet swelling   Gastrointestinal: Negative for constipation and diarrhea.   Musculoskeletal: Positive for neck stiffness. Negative for back pain and neck pain.   Neurological: Positive for weakness and numbness. Negative for speech difficulty.   Psychiatric/Behavioral: Negative for confusion. The patient is not nervous/anxious.        Past Medical History  Past Medical History:   Diagnosis Date    Arthritis     Hypertension     Hypoparathyroidism     Dr. Rahman    Thyroid disease        Medications    Current Outpatient Prescriptions:     albuterol 90 mcg/actuation inhaler, Inhale 2 puffs into the lungs every 6 (six) hours as needed., Disp: 1 Inhaler, Rfl: 0    calcitRIOL (ROCALTROL) 0.25 MCG Cap, Take by mouth 2 (two) times daily. Takes 2 tablets in the morning and 1 tablet in the evening, Disp: , Rfl:     calcium carb and citrate-vitD3 (CITRACAL + D SLOW RELEASE) 600 mg calcium- 500 unit TbSR, Take 1 tablet by mouth 3 (three) times daily., Disp: , Rfl:     cyanocobalamin 1,000 mcg/mL injection, Inject 1 mL (1,000 mcg total) into the skin As instructed. 1000 mcg (1 ml) subcutaneous injection daily x 5 days, then weekly x 4 weeks, then monthly thereafter (Patient taking differently: Inject 1,000 mcg into the skin every 30 days. ), Disp: 20 mL, Rfl: 0    doxycycline (VIBRAMYCIN) 100 MG Cap, Take 1 capsule (100 mg total) by mouth every 12 (twelve) hours., Disp: 20 capsule, Rfl: 0    liothyronine (CYTOMEL)  "5 MCG Tab, Take 25 mcg by mouth once daily., Disp: , Rfl:     pantoprazole (PROTONIX) 40 MG tablet, Take 40 mg by mouth once daily., Disp: , Rfl: 0    predniSONE (DELTASONE) 20 MG tablet, Take 20 mg by mouth once daily. , Disp: , Rfl:     SYNTHROID 175 mcg tablet, Take 175 mcg by mouth once daily. , Disp: , Rfl:     valsartan (DIOVAN) 80 MG tablet, Take 1 tablet (80 mg total) by mouth once daily., Disp: 90 tablet, Rfl: 3    Allergies  Review of patient's allergies indicates:  No Known Allergies    Social History  Social History     Social History    Marital status:      Spouse name: N/A    Number of children: N/A    Years of education: N/A     Occupational History    Not on file.     Social History Main Topics    Smoking status: Never Smoker    Smokeless tobacco: Never Used    Alcohol use No    Drug use: No    Sexual activity: Yes     Partners: Male     Other Topics Concern    Not on file     Social History Narrative    No narrative on file       Family History  Family History   Problem Relation Age of Onset    Hypertension Father     Stomach cancer Father     Diabetes Father     Diabetes Maternal Grandmother     Hypertension Maternal Grandmother     Stomach cancer Maternal Aunt     Stomach cancer Paternal Aunt     Breast cancer Paternal Aunt     Parkinsonism Paternal Aunt        Physical Exam  /82   Pulse 78   Ht 5' 8" (1.727 m)   Wt 60.3 kg (133 lb)   BMI 20.22 kg/m²     General appearance: Well-developed, well-groomed.     Neurologic Exam: The patient is awake, alert and oriented. Language is fluent. Recent and remote memory are normal. Attention span and concentration are normal. Fund of knowledge is appropriate.     Cranial nerves: pupils are round and reactive to light and accommodation. Visual fields are full to confrontation.  Ocular motility is full in all cardinal positions of gaze. Facial sensation is normal to pinprick and light touch. Corneal reflexes are present " bilaterally. Facial activation is symmetric. Hearing is normal bilaterally. Palate elevates symmetrically. She has no tongue fasciculations or atrophy. She has 4/5 strength in neck flexion and extension.     Motor examination of all extremities demonstrates decreased bulk and tone in all four limbs She has atrophy of deltoids, intrinsic hand muscles and anterior lower extremity muscles. Fasciculations in the right biceps, pectoralis, forearm extensors and intrinsic hand muscles; also in left biceps; fasciculations in the quadriceps bilaterally and left gastrocnemius. Strength is as follows, R/L:    Deltoid: 1/1  BB: 2/2  TRI: 3/4-  HF: 1/1  Quads: 5-/5-  HF: 4-/4-  DF: 0/0  PF: 4/4    Sensory examination: decrement to temp sensation in distal LE to knees bilaterally. PP and vibration are spared.    Deep tendon reflexes: quiet in the UE; 2+ at knees and absent at ankles. Toes mute.     Gait: Not assessed secondary to weakness.    Coordination: Finger to nose and heel to shin testing is normal in both upper and lower extremities. Rapid alternating movements are normal in both upper and lower extremities.     General exam  Cardiovascular: regular rate and rhythm with no murmurs, rubs or gallops. There are no carotid or vertebral artery bruits. Pulses in both upper and lower extremities are symmetric. She was swelling in both feet. No calf swelling. No pain.  Head and neck: no cervical lymphadenopathy      Other Tests  EMG 9/16: absence of the right ulnar, radial, and sural sensory responses. Right median sensory distal latency prolongation. CNE with acute denervation/increase insertional activity in the right deltoid, BB, TRI. Enlarged MUPs in all RUE and RLE muscles tested.     Right sural nerve biopsy 1/16/18: axonal degeneration without evidence of vasculitis or inflammation.    Assessment and Plan    Problem List Items Addressed This Visit     Inflammatory neuropathy - Primary    Overview     Onset RUE 2/16,  progression to WC bound 2/18  Some question of response to prednisone  Query autoimmune/inflammatory neuropathy         Current Assessment & Plan     Recommend IVIg for possible autoimmune/inflammatory neuropathy since there is some evidence that she responded to prednisone. She's likely under-dosed currently, but cannot tolerate higher doses. IVIg will give us another diagnostic/therapeutic tool to determine whether this is a reversible process.     Other possibilities include MND with a concomitant sensory neuropathy caused by B12 deficiency. The pattern and time course of progression are c/w MND, and she has widespread fasciculations and muscle wasting, along with weight loss and diaphragm weakness. She does not have UMN signs on exam, however.     Could consider obtaining GM1 antibodies for possible MMN (again, would have to explain the sensory findings by B12 deficiency) if she responds to IVIg.     Chem panel and IgA today were normal, so will provide IVIg 2g/kg over 3 days, followed by monthly infusions thereafter.          Relevant Orders    IgA (Completed)    Basic metabolic panel (Completed)                Jeremy Harkins MD, LYNDA, FAAN  Department of Neurology  [facility] [place] [address]

## 2018-05-17 ENCOUNTER — OFFICE VISIT (OUTPATIENT)
Dept: NEUROLOGY | Facility: CLINIC | Age: 58
End: 2018-05-17
Payer: COMMERCIAL

## 2018-05-17 VITALS
BODY MASS INDEX: 20.16 KG/M2 | WEIGHT: 133 LBS | DIASTOLIC BLOOD PRESSURE: 82 MMHG | SYSTOLIC BLOOD PRESSURE: 128 MMHG | HEART RATE: 78 BPM | HEIGHT: 68 IN

## 2018-05-17 DIAGNOSIS — G61.9 INFLAMMATORY NEUROPATHY: Primary | ICD-10-CM

## 2018-05-17 PROCEDURE — 3008F BODY MASS INDEX DOCD: CPT | Mod: CPTII,S$GLB,, | Performed by: PSYCHIATRY & NEUROLOGY

## 2018-05-17 PROCEDURE — 3079F DIAST BP 80-89 MM HG: CPT | Mod: CPTII,S$GLB,, | Performed by: PSYCHIATRY & NEUROLOGY

## 2018-05-17 PROCEDURE — 99999 PR PBB SHADOW E&M-EST. PATIENT-LVL III: CPT | Mod: PBBFAC,,, | Performed by: PSYCHIATRY & NEUROLOGY

## 2018-05-17 PROCEDURE — 3074F SYST BP LT 130 MM HG: CPT | Mod: CPTII,S$GLB,, | Performed by: PSYCHIATRY & NEUROLOGY

## 2018-05-17 PROCEDURE — 99215 OFFICE O/P EST HI 40 MIN: CPT | Mod: S$GLB,,, | Performed by: PSYCHIATRY & NEUROLOGY

## 2018-05-17 NOTE — Clinical Note
I signed the therapy plan. I would like her to receive 2g/kg over 3 days, once a month. I have read and re-read the therapy plan info sheet that Priscilla gave me, but there's not really a way to express this kind of interval within the plan parameters.

## 2018-05-17 NOTE — LETTER
May 18, 2018      Anish Shay MD  [address]  [facility] [address]           [facility]  [address] [facility] [address]  Phone: [phone]  Fax: [phone]          Patient: Reta Styles   MR Number: [id number]   YOB: 1960   Date of Visit: 5/17/2018       Dear Dr. Anish Shay:    Thank you for referring Reta Styles to me for evaluation. Attached you will find relevant portions of my assessment and plan of care.    If you have questions, please do not hesitate to call me. I look forward to following Reta Styles along with you.    Sincerely,    Joel Harkins MD    Enclosure  CC:  No Recipients    If you would like to receive this communication electronically, please contact [email or web address] or [phone] to request more information on [employer] Link access.    For providers and/or their staff who would like to refer a patient to [facility], please contact us through our one-stop-shop provider referral line, [facility], at [phone].    If you feel you have received this communication in error or would no longer like to receive these types of communications, please e-mail [email or web address]

## 2018-05-18 ENCOUNTER — CLINICAL SUPPORT (OUTPATIENT)
Dept: REHABILITATION | Facility: HOSPITAL | Age: 58
End: 2018-05-18
Payer: COMMERCIAL

## 2018-05-18 DIAGNOSIS — R29.898 BILATERAL ARM WEAKNESS: Primary | ICD-10-CM

## 2018-05-18 PROBLEM — G61.9 INFLAMMATORY NEUROPATHY: Status: ACTIVE | Noted: 2018-05-18

## 2018-05-18 RX ORDER — ACETAMINOPHEN 325 MG/1
325 TABLET ORAL EVERY 4 HOURS PRN
Status: CANCELLED | OUTPATIENT
Start: 2018-05-21 | End: 2018-05-22

## 2018-05-18 NOTE — PLAN OF CARE
Date: 5-18-18    Time in: 925  Time out: 10    Procedures: eval  Start: 925  Stop: 10    Total untimed minutes: 35  Charges billed # of units: 1    Onset date: early 2016  Primary dx: muscle weakness per info in epic in referral section  Tx dx: b arm weakness    Pmhx relevant to primary or tx dx: see below    Precautions: universal  Prior therapy: brief in acute per recent hospital stay for pneumonia  Medications relevant to primary or tx dx: n/a  Nutrition: wnl  Hx of present illness: insidious onset of r ue weakness followed by remaining extremities getting weak; has seen primary care, neurologist with [facility], and ortho for shoulder pain; saw neuro with [facility] and recently saw another neuro with [facility]; per chart review and pt. It sounds like some form of tx is in the works per current neuro; therapy referral is from discharging md from hospital stay  PLOF: full use and doing all required tasks  Social hx: family lives with her  Fxnl deficits leading to referral: significant decrease in b ue functional use  Patient therapy goals: maximize functional use b ue    Subjective    Patient states: she understands I will send in basket message to current neuro md to see if outpatient OT is wanted at this time considering unknown etiology of b ue weakness    Pain: 0/10 at rest, with sleep, with light use    Objective    Posture: lydia deferred, 1 finger inferior subluxation b gh joints  Palpation: nt  Sensation: intermittent tingling b hands  ROM: full prom shoulder thur hand b arms  Edema: none  ADL: max decrease in functional independence  Hand dominance: r  Job: not working  DME: w/c, rw, bsc  DTRs: appear intact  Proprioception: nt  FMC: min decrease  GMC: min decrease  Duties:Normal self and home care tasks  Tx: explained to pt. Ideally root cause of weakness needs to be determined since this and any tx issued strongly dictates sequence of formal strengthening exercises for b arms    Assessment    Initial assessment  (pertinent findings, problem list and factors affecting outcome): voluntary motion and motor control seem to be wnl, no abnormal tone noted  Rehab potential: to be determined    Goals: to be determined      Plan    Pending response from current neuro, send this eval to referring md for signature    Therapist: brennan duarte cht        eval code grid    Profile and History Assessment of Occupational Performance Level of Clinical Decision Making Complexity Score   Occupational Profile:   Reta Styles is a 58 y.o. female who lives with their family and is currently not working . Reta Styles has difficulty with  feeding, bathing, grooming, dressing and and all required tasks.  affecting his/her daily functional abilities. His/her main goal for therapy is to maximize functional use.     Comorbidities:   n/a    Medical and Therapy History Review:   Brief               Performance Deficits    Physical:  b ue weakness    Cognitive:  No Deficits    Psychosocial:    No Deficits     Clinical Decision Making:  low    Assessment Process:  Problem-Focused Assessments    Modification/Need for Assistance:  Not Necessary    Intervention Selection:  Limited Treatment Options       low  Based on PMHX, co morbidities , data from assessments and functional level of assistance required with task and clinical presentation directly impacting function.         6-15-18: d/c since pt. Has not returned for therapy

## 2018-05-18 NOTE — ASSESSMENT & PLAN NOTE
Recommend IVIg for possible autoimmune/inflammatory neuropathy since there is some evidence that she responded to prednisone. She's likely under-dosed currently, but cannot tolerate higher doses. IVIg will give us another diagnostic/therapeutic tool to determine whether this is a reversible process.     Other possibilities include MND with a concomitant sensory neuropathy caused by B12 deficiency. The pattern and time course of progression are c/w MND, and she has widespread fasciculations and muscle wasting, along with weight loss and diaphragm weakness. She does not have UMN signs on exam, however.     Could consider obtaining GM1 antibodies for possible MMN (again, would have to explain the sensory findings by B12 deficiency) if she responds to IVIg.     Chem panel and IgA today were normal, so will provide IVIg 2g/kg over 3 days, followed by monthly infusions thereafter.

## 2018-05-21 ENCOUNTER — CLINICAL SUPPORT (OUTPATIENT)
Dept: REHABILITATION | Facility: HOSPITAL | Age: 58
End: 2018-05-21
Payer: COMMERCIAL

## 2018-05-21 DIAGNOSIS — R26.9 GAIT ABNORMALITY: ICD-10-CM

## 2018-05-21 PROCEDURE — 97162 PT EVAL MOD COMPLEX 30 MIN: CPT | Mod: PN

## 2018-05-21 NOTE — PLAN OF CARE
TIME RECORD    05/21/2018    Start Time:   1409  Stop Time:   1457    PROCEDURES:    TIMED  Procedure Time Min.    Start:  Stop:     Start:  Stop:     Start:  Stop:     Start:  Stop:          UNTIMED  Procedure Time Min.   Evaluation Start:  Stop:     Start:  Stop:      Total Timed Minutes:  0  Total Timed Units:  0  Total Untimed Units:  1  Charges Billed/# of units:  1  OUTPATIENT PHYSICAL THERAPY   PATIENT EVALUATION  Onset Date:   Problem List Items Addressed This Visit     Gait abnormality          Medical Diagnosis:   M62.81 (ICD-10-CM) - Muscle weakness   R27.9 (ICD-10-CM) - Lack of coordination     Treatment Diagnosis: gait abnormality    Past Medical History:   Diagnosis Date    Arthritis     Hypertension     Hypoparathyroidism     Dr. Rahman    Thyroid disease        Past Surgical History:   Procedure Laterality Date    parathyroid removal      PARATHYROIDECTOMY Bilateral 08/2017    TUBAL LIGATION         has a current medication list which includes the following prescription(s): albuterol, calcitriol, calcium carb and citrate-vitd3, cyanocobalamin, doxycycline, liothyronine, pantoprazole, prednisone, synthroid, and valsartan.    Precautions: HTN  Prior Therapy: Recent acute care setting for [facility]  History of Present Illness: Patient presenting to PT with c/o progressive weakness of both UE's and LE's over 2 years, leading to walking with a walker, eventually becoming w/c bound. Has been seen by several neurologists, including one at [facility] and most recently with Dr. Harkins at [facility]. Appears to be in the process of treatment per current neurologist. Her orders for PT and OT are from discharging hospitalist at [facility].    Prior Level of Function: Independent  Social History: lives with mother and daughter in [place] [facility] with a ramp installed, 3 SOT[place] in back of house with a railing      Current level of function: Assistance for all ADL's  Equipment: hospital bed (sleeps with HOB elevated due  "to orthopnea), SC, BSC, rollator, RW  Functional Deficits Leading to Referral/Nature of Injury:   Patient Therapy Goals: "Jog out of here." Walk out of here with no walker.      Subjective     Retavasquez Styles states no pain issues at this time.      Objective     Posture/Appearance: Seated with fwd head position, rounded shoulders, sacral sitting  Sensation: tingling/numbness in fingers and toes  DTRs:    Range of Motion/Strength:    Lower Extremity Range of Motion:  Right Lower Extremity: grossly WFL  Left Lower Extremity: grossly WFL    Lower Extremity Strength:  Right Lower Extremity: hip flex 2+/5, knee ext 4-/5, knee flex 2/5, ankle DF 2+/5, PF 3+/5  Left Lower Extremity: hip flex 2/5, knee ext 4-/5, knee flex 3/5, ankle DF 2/5, PF 3+/5      Flexibility: ROM as per above  Gait: NT secondary to weakness  Bed Mobility: Supine<>sit max A  Transfers: w/c<>mat max A  Functional Outcome Measure: Lower Extremity Functional Scale (LEFS): 4/80=95% impairment  Treatment: Educated on role/goal PT, POC. Discussed getting clarification from current neurologist, as this will impact therapy exercises and progression. Pt verbalizing understanding and agreement.     Assessment       Initial Assessment (Pertinent finding, problem list and factors affecting outcome): Patient presents to PT with c/o progressive weakness. Notable impairments include weakness, decreased tolerance to activity, impaired ADL's, and impaired functional mobility including transfers and gait. Patient would benefit from skilled PT to address notable impairments and improve functional mobility to PLOF.      Rehab Potiential: fair      Short Term Goals (2 Weeks): 1) Pt will initiate HEP 2) Patient will improve strength 1/2 muscle grade  Long Term Goals (4 Weeks): 1) Pt will be I with HEP 2) Pt will return to I/ADL's with strength 4-/5 or > 3) Pt will improve transfers to min A    Plan     Certification Period: 05/21/2018 to 07/01/2018  Recommended Treatment " Plan: 2 times per week for 4  weeks: Gait Training, Manual Therapy, Moist Heat/Ice, Neuromuscular Re-ed, Patient Education, Therapeutic Activities, Therapeutic Exercise    Thank you for referral.    Therapist: Lisa Lake PT    I CERTIFY THE NEED FOR THESE SERVICES FURNISHED UNDER THIS PLAN OF TREATMENT AND WHILE UNDER MY CARE    Physician's comments: ________________________________________________________________________________________________________________________________________________      Physician's Name: ___________________________________

## 2018-05-22 ENCOUNTER — TELEPHONE (OUTPATIENT)
Dept: NEUROLOGY | Facility: CLINIC | Age: 58
End: 2018-05-22

## 2018-05-22 NOTE — TELEPHONE ENCOUNTER
----- Message from Dagoberto Bullock sent at 5/22/2018 10:45 AM CDT -----  Contact: Hardik (Daughter) [phone]  Needs Medical Advice    Who Called: Abbey   :    Communication Preference: Hardik would like to speak to Roseanne regarding the patient's IVIG treatment and physical therapy orders. Please contact the patient's daughter to discuss further.

## 2018-05-22 NOTE — TELEPHONE ENCOUNTER
Greetings,    Looks like therapy plan is in system (5/18/18). Would like to proceed with scheduling patient, once authorization obtained. Insurance [employer] of [facility].    Thanks in advance!  LEONARDO Manzo  [id number]

## 2018-05-23 RX ORDER — ACETAMINOPHEN 500 MG
500 TABLET ORAL
Status: CANCELLED | OUTPATIENT
Start: 2018-05-24

## 2018-05-23 RX ORDER — DIPHENHYDRAMINE HCL 25 MG
25 CAPSULE ORAL
Status: CANCELLED | OUTPATIENT
Start: 2018-05-24

## 2018-05-23 RX ORDER — SODIUM CHLORIDE 0.9 % (FLUSH) 0.9 %
10 SYRINGE (ML) INJECTION
Status: CANCELLED | OUTPATIENT
Start: 2018-05-24

## 2018-05-23 RX ORDER — DIPHENHYDRAMINE HYDROCHLORIDE 50 MG/ML
25 INJECTION INTRAMUSCULAR; INTRAVENOUS
Status: CANCELLED | OUTPATIENT
Start: 2018-05-24

## 2018-05-23 RX ORDER — HEPARIN 100 UNIT/ML
500 SYRINGE INTRAVENOUS
Status: CANCELLED | OUTPATIENT
Start: 2018-05-24

## 2018-05-24 ENCOUNTER — TELEPHONE (OUTPATIENT)
Dept: REHABILITATION | Facility: HOSPITAL | Age: 58
End: 2018-05-24

## 2018-06-06 ENCOUNTER — INFUSION (OUTPATIENT)
Dept: INFECTIOUS DISEASES | Facility: HOSPITAL | Age: 58
End: 2018-06-06
Attending: INTERNAL MEDICINE
Payer: COMMERCIAL

## 2018-06-06 VITALS
BODY MASS INDEX: 20.21 KG/M2 | HEART RATE: 62 BPM | WEIGHT: 132.94 LBS | SYSTOLIC BLOOD PRESSURE: 167 MMHG | TEMPERATURE: 98 F | DIASTOLIC BLOOD PRESSURE: 76 MMHG

## 2018-06-06 DIAGNOSIS — G61.9 INFLAMMATORY NEUROPATHY: Primary | ICD-10-CM

## 2018-06-06 PROCEDURE — 96365 THER/PROPH/DIAG IV INF INIT: CPT

## 2018-06-06 PROCEDURE — 25000003 PHARM REV CODE 250

## 2018-06-06 PROCEDURE — 96366 THER/PROPH/DIAG IV INF ADDON: CPT

## 2018-06-06 PROCEDURE — 63600175 PHARM REV CODE 636 W HCPCS: Mod: JG

## 2018-06-06 RX ORDER — DIPHENHYDRAMINE HCL 25 MG
25 CAPSULE ORAL
Status: DISCONTINUED | OUTPATIENT
Start: 2018-06-06 | End: 2018-06-06 | Stop reason: HOSPADM

## 2018-06-06 RX ORDER — SODIUM CHLORIDE 0.9 % (FLUSH) 0.9 %
10 SYRINGE (ML) INJECTION
Status: DISCONTINUED | OUTPATIENT
Start: 2018-06-06 | End: 2018-06-06 | Stop reason: HOSPADM

## 2018-06-06 RX ORDER — ACETAMINOPHEN 500 MG
500 TABLET ORAL
Status: DISCONTINUED | OUTPATIENT
Start: 2018-06-06 | End: 2018-06-06 | Stop reason: HOSPADM

## 2018-06-06 RX ORDER — HEPARIN 100 UNIT/ML
500 SYRINGE INTRAVENOUS
Status: DISCONTINUED | OUTPATIENT
Start: 2018-06-06 | End: 2018-06-06 | Stop reason: HOSPADM

## 2018-06-06 RX ORDER — DIPHENHYDRAMINE HYDROCHLORIDE 50 MG/ML
25 INJECTION INTRAMUSCULAR; INTRAVENOUS
Status: CANCELLED | OUTPATIENT
Start: 2018-06-06

## 2018-06-06 RX ORDER — ACETAMINOPHEN 500 MG
500 TABLET ORAL
Status: CANCELLED | OUTPATIENT
Start: 2018-06-06

## 2018-06-06 RX ORDER — HEPARIN 100 UNIT/ML
500 SYRINGE INTRAVENOUS
Status: CANCELLED | OUTPATIENT
Start: 2018-06-06

## 2018-06-06 RX ORDER — SODIUM CHLORIDE 0.9 % (FLUSH) 0.9 %
10 SYRINGE (ML) INJECTION
Status: CANCELLED | OUTPATIENT
Start: 2018-06-06

## 2018-06-06 RX ORDER — DIPHENHYDRAMINE HCL 25 MG
25 CAPSULE ORAL
Status: CANCELLED | OUTPATIENT
Start: 2018-06-06

## 2018-06-06 RX ADMIN — DIPHENHYDRAMINE HYDROCHLORIDE 25 MG: 25 CAPSULE ORAL at 08:06

## 2018-06-06 RX ADMIN — HUMAN IMMUNOGLOBULIN G 40 G: 40 LIQUID INTRAVENOUS at 08:06

## 2018-06-06 RX ADMIN — ACETAMINOPHEN 500 MG: 500 TABLET ORAL at 08:06

## 2018-06-06 NOTE — PROGRESS NOTES
Pt arrived to infusion suite for IVIG privigen.  Premeds of tylenol and benadryl administered PO.  Then privigen initiated 30 minutes later at rate of 36cc/hr.  Rate increased a couple times throughout infusion per Dr. WHITLOCK protocol to 72cc/hr then to 144cc/hr.  Pt tolerated well and left in NAD.

## 2018-06-07 ENCOUNTER — INFUSION (OUTPATIENT)
Dept: INFECTIOUS DISEASES | Facility: HOSPITAL | Age: 58
End: 2018-06-07
Attending: INTERNAL MEDICINE
Payer: COMMERCIAL

## 2018-06-07 VITALS
DIASTOLIC BLOOD PRESSURE: 83 MMHG | HEIGHT: 68 IN | SYSTOLIC BLOOD PRESSURE: 161 MMHG | WEIGHT: 132.94 LBS | HEART RATE: 86 BPM | BODY MASS INDEX: 20.15 KG/M2

## 2018-06-07 DIAGNOSIS — G61.9 INFLAMMATORY NEUROPATHY: Primary | ICD-10-CM

## 2018-06-07 PROCEDURE — 96365 THER/PROPH/DIAG IV INF INIT: CPT

## 2018-06-07 PROCEDURE — 96366 THER/PROPH/DIAG IV INF ADDON: CPT

## 2018-06-07 PROCEDURE — 63600175 PHARM REV CODE 636 W HCPCS: Mod: JG | Performed by: PSYCHIATRY & NEUROLOGY

## 2018-06-07 PROCEDURE — 25000003 PHARM REV CODE 250: Performed by: PSYCHIATRY & NEUROLOGY

## 2018-06-07 RX ORDER — ACETAMINOPHEN 500 MG
500 TABLET ORAL
Status: CANCELLED | OUTPATIENT
Start: 2018-06-07

## 2018-06-07 RX ORDER — SODIUM CHLORIDE 0.9 % (FLUSH) 0.9 %
10 SYRINGE (ML) INJECTION
Status: DISCONTINUED | OUTPATIENT
Start: 2018-06-07 | End: 2018-06-07 | Stop reason: HOSPADM

## 2018-06-07 RX ORDER — DIPHENHYDRAMINE HCL 25 MG
25 CAPSULE ORAL
Status: CANCELLED | OUTPATIENT
Start: 2018-06-07

## 2018-06-07 RX ORDER — ACETAMINOPHEN 500 MG
500 TABLET ORAL
Status: DISCONTINUED | OUTPATIENT
Start: 2018-06-07 | End: 2018-06-07 | Stop reason: HOSPADM

## 2018-06-07 RX ORDER — DIPHENHYDRAMINE HCL 25 MG
25 CAPSULE ORAL
Status: DISCONTINUED | OUTPATIENT
Start: 2018-06-07 | End: 2018-06-07 | Stop reason: HOSPADM

## 2018-06-07 RX ORDER — HEPARIN 100 UNIT/ML
500 SYRINGE INTRAVENOUS
Status: DISCONTINUED | OUTPATIENT
Start: 2018-06-07 | End: 2018-06-07 | Stop reason: HOSPADM

## 2018-06-07 RX ORDER — DIPHENHYDRAMINE HYDROCHLORIDE 50 MG/ML
25 INJECTION INTRAMUSCULAR; INTRAVENOUS
Status: CANCELLED | OUTPATIENT
Start: 2018-06-07

## 2018-06-07 RX ORDER — HEPARIN 100 UNIT/ML
500 SYRINGE INTRAVENOUS
Status: CANCELLED | OUTPATIENT
Start: 2018-06-07

## 2018-06-07 RX ORDER — SODIUM CHLORIDE 0.9 % (FLUSH) 0.9 %
10 SYRINGE (ML) INJECTION
Status: CANCELLED | OUTPATIENT
Start: 2018-06-07

## 2018-06-07 RX ADMIN — ACETAMINOPHEN 500 MG: 500 TABLET ORAL at 08:06

## 2018-06-07 RX ADMIN — DIPHENHYDRAMINE HYDROCHLORIDE 25 MG: 25 CAPSULE ORAL at 08:06

## 2018-06-07 RX ADMIN — HUMAN IMMUNOGLOBULIN G 40 G: 40 LIQUID INTRAVENOUS at 08:06

## 2018-06-07 NOTE — PROGRESS NOTES
Pt arrived to infusion suite for IVIG privigen.  Premeds of tylenol and benadryl administered PO.  Then privigen initiated 30 minutes later at rate of 72cc/hr.  Rate increasedduring infusion per Dr. WHITLOCK protocol to 144cc/hr.  Pt tolerated well and left in NAD.

## 2018-06-08 ENCOUNTER — INFUSION (OUTPATIENT)
Dept: INFECTIOUS DISEASES | Facility: HOSPITAL | Age: 58
End: 2018-06-08
Attending: INTERNAL MEDICINE
Payer: COMMERCIAL

## 2018-06-08 VITALS — BODY MASS INDEX: 20.15 KG/M2 | WEIGHT: 132.94 LBS | HEIGHT: 68 IN

## 2018-06-08 DIAGNOSIS — G61.9 INFLAMMATORY NEUROPATHY: Primary | ICD-10-CM

## 2018-06-08 PROCEDURE — 63600175 PHARM REV CODE 636 W HCPCS: Mod: JG | Performed by: PSYCHIATRY & NEUROLOGY

## 2018-06-08 PROCEDURE — 96366 THER/PROPH/DIAG IV INF ADDON: CPT

## 2018-06-08 PROCEDURE — 25000003 PHARM REV CODE 250: Performed by: PSYCHIATRY & NEUROLOGY

## 2018-06-08 PROCEDURE — 96365 THER/PROPH/DIAG IV INF INIT: CPT

## 2018-06-08 RX ORDER — ACETAMINOPHEN 500 MG
500 TABLET ORAL
Status: CANCELLED | OUTPATIENT
Start: 2018-06-08

## 2018-06-08 RX ORDER — DIPHENHYDRAMINE HCL 25 MG
25 CAPSULE ORAL
Status: DISCONTINUED | OUTPATIENT
Start: 2018-06-08 | End: 2018-06-08 | Stop reason: HOSPADM

## 2018-06-08 RX ORDER — DIPHENHYDRAMINE HCL 25 MG
25 CAPSULE ORAL
Status: CANCELLED | OUTPATIENT
Start: 2018-06-08

## 2018-06-08 RX ORDER — DIPHENHYDRAMINE HYDROCHLORIDE 50 MG/ML
25 INJECTION INTRAMUSCULAR; INTRAVENOUS
Status: CANCELLED | OUTPATIENT
Start: 2018-06-08

## 2018-06-08 RX ORDER — SODIUM CHLORIDE 0.9 % (FLUSH) 0.9 %
10 SYRINGE (ML) INJECTION
Status: CANCELLED | OUTPATIENT
Start: 2018-06-08

## 2018-06-08 RX ORDER — HEPARIN 100 UNIT/ML
500 SYRINGE INTRAVENOUS
Status: CANCELLED | OUTPATIENT
Start: 2018-06-08

## 2018-06-08 RX ORDER — ACETAMINOPHEN 500 MG
500 TABLET ORAL
Status: DISCONTINUED | OUTPATIENT
Start: 2018-06-08 | End: 2018-06-08 | Stop reason: HOSPADM

## 2018-06-08 RX ADMIN — HUMAN IMMUNOGLOBULIN G 40 G: 40 LIQUID INTRAVENOUS at 09:06

## 2018-06-08 RX ADMIN — DIPHENHYDRAMINE HYDROCHLORIDE 25 MG: 25 CAPSULE ORAL at 09:06

## 2018-06-08 RX ADMIN — ACETAMINOPHEN 500 MG: 500 TABLET ORAL at 08:06

## 2018-06-14 ENCOUNTER — PATIENT MESSAGE (OUTPATIENT)
Dept: NEUROLOGY | Facility: CLINIC | Age: 58
End: 2018-06-14

## 2018-06-19 ENCOUNTER — PATIENT MESSAGE (OUTPATIENT)
Dept: NEUROLOGY | Facility: CLINIC | Age: 58
End: 2018-06-19

## 2018-09-05 ENCOUNTER — DOCUMENTATION ONLY (OUTPATIENT)
Dept: NEUROLOGY | Facility: CLINIC | Age: 58
End: 2018-09-05

## 2018-09-05 NOTE — PROGRESS NOTES
Completed/signed Prescriber orders to continue Gammaplex faxed to [employer]/[facility] with supporting documentation/Maryam (P)[phone]  (F) [phone].  No other needs verbalized at this time.  Task completed.        SIXTO CRANE

## 2018-09-10 ENCOUNTER — TELEPHONE (OUTPATIENT)
Dept: NEUROLOGY | Facility: CLINIC | Age: 58
End: 2018-09-10

## 2018-09-10 NOTE — TELEPHONE ENCOUNTER
Left a message for the patient to advise an appointment scheduled for her on 9/25 at 10:30. The patient was asked to call back to confirm or reschedule if this is not a good day and time for her.

## 2018-09-11 ENCOUNTER — TELEPHONE (OUTPATIENT)
Dept: NEUROLOGY | Facility: CLINIC | Age: 58
End: 2018-09-11

## 2018-09-11 NOTE — TELEPHONE ENCOUNTER
----- Message from Shahida Nails sent at 9/11/2018  9:38 AM CDT -----  Contact: john with "[employer] script [phone]            Name of Who is Calling: john      What is the request in detail: needs to clarify dose gammaplex. Please call for verbal.      Can the clinic reply by [facility]: no      What Number to Call Back if not in [facility]: john with bio script [phone]

## 2018-09-11 NOTE — TELEPHONE ENCOUNTER
Spoke with Edith Francisco, at [facility] regarding dosage for IVIG. Patient to receive Gammaplex 10% (2gm/kg x 54kg = 108gm over 3 days) 36gm/360ml IV. Dispense 3 with 12 refills.     Orders signed by Dr. Harkins and faxed to [phone]. Also, sent order via email to [email or web address].      RN informed patient and her daughter, Hardik, that orders have been sent to pharmacy. Ms. Mcduffie to receive IVIG over 3 days every 4 weeks. Hardik mentioned that infusion pump was at the home from last infusion received on 8/20/18.

## 2018-09-12 ENCOUNTER — HOSPITAL ENCOUNTER (INPATIENT)
Facility: HOSPITAL | Age: 58
LOS: 2 days | Discharge: HOME HEALTH | DRG: 208 | End: 2018-09-14
Attending: EMERGENCY MEDICINE | Admitting: HOSPITALIST
Payer: COMMERCIAL

## 2018-09-12 DIAGNOSIS — Z93.0 TRACHEOSTOMY STATUS: ICD-10-CM

## 2018-09-12 DIAGNOSIS — J95.851 VAP (VENTILATOR-ASSOCIATED PNEUMONIA): Primary | ICD-10-CM

## 2018-09-12 PROBLEM — G61.81 CIDP (CHRONIC INFLAMMATORY DEMYELINATING POLYNEUROPATHY): Status: ACTIVE | Noted: 2018-09-12

## 2018-09-12 PROBLEM — D50.8 IRON DEFICIENCY ANEMIA SECONDARY TO INADEQUATE DIETARY IRON INTAKE: Status: ACTIVE | Noted: 2018-09-12

## 2018-09-12 PROBLEM — Z93.1 PEG (PERCUTANEOUS ENDOSCOPIC GASTROSTOMY) STATUS: Status: ACTIVE | Noted: 2018-09-12

## 2018-09-12 PROBLEM — L89.159 PRESSURE ULCER OF SACRAL REGION: Status: ACTIVE | Noted: 2018-09-12

## 2018-09-12 PROBLEM — K29.00 ACUTE GASTRITIS WITHOUT HEMORRHAGE: Status: ACTIVE | Noted: 2018-09-12

## 2018-09-12 PROBLEM — Z99.11 VENTILATOR DEPENDENT: Status: ACTIVE | Noted: 2018-09-12

## 2018-09-12 PROBLEM — L89.153 PRESSURE ULCER OF SACRAL REGION, STAGE 3: Status: ACTIVE | Noted: 2018-09-12

## 2018-09-12 PROBLEM — G82.50 QUADRIPLEGIA: Status: ACTIVE | Noted: 2018-09-12

## 2018-09-12 PROBLEM — E87.6 HYPOKALEMIA: Status: ACTIVE | Noted: 2018-09-12

## 2018-09-12 PROBLEM — J96.11 CHRONIC RESPIRATORY FAILURE WITH HYPOXIA: Status: ACTIVE | Noted: 2018-09-12

## 2018-09-12 LAB
ANION GAP SERPL CALC-SCNC: 11 MMOL/L
BASOPHILS # BLD AUTO: 0 K/UL
BASOPHILS NFR BLD: 0 %
BUN SERPL-MCNC: 14 MG/DL
CALCIUM SERPL-MCNC: 9.1 MG/DL
CHLORIDE SERPL-SCNC: 105 MMOL/L
CO2 SERPL-SCNC: 25 MMOL/L
CREAT SERPL-MCNC: 0.4 MG/DL
DIFFERENTIAL METHOD: ABNORMAL
EOSINOPHIL # BLD AUTO: 0.1 K/UL
EOSINOPHIL NFR BLD: 0.3 %
ERYTHROCYTE [DISTWIDTH] IN BLOOD BY AUTOMATED COUNT: 17.5 %
EST. GFR  (AFRICAN AMERICAN): >60 ML/MIN/1.73 M^2
EST. GFR  (NON AFRICAN AMERICAN): >60 ML/MIN/1.73 M^2
GLUCOSE SERPL-MCNC: 124 MG/DL
HCT VFR BLD AUTO: 32.5 %
HGB BLD-MCNC: 10.8 G/DL
IRON SERPL-MCNC: 13 UG/DL
LACTATE SERPL-SCNC: 1.4 MMOL/L
LACTATE SERPL-SCNC: 1.4 MMOL/L
LYMPHOCYTES # BLD AUTO: 0.9 K/UL
LYMPHOCYTES NFR BLD: 4.6 %
MCH RBC QN AUTO: 30.2 PG
MCHC RBC AUTO-ENTMCNC: 33.1 G/DL
MCV RBC AUTO: 91 FL
MONOCYTES # BLD AUTO: 1.2 K/UL
MONOCYTES NFR BLD: 6.4 %
NEUTROPHILS # BLD AUTO: 17.2 K/UL
NEUTROPHILS NFR BLD: 88.7 %
PLATELET # BLD AUTO: 483 K/UL
PMV BLD AUTO: 7.6 FL
POTASSIUM SERPL-SCNC: 3.2 MMOL/L
RBC # BLD AUTO: 3.57 M/UL
SATURATED IRON: 5 %
SODIUM SERPL-SCNC: 141 MMOL/L
TOTAL IRON BINDING CAPACITY: 263 UG/DL
TRANSFERRIN SERPL-MCNC: 178 MG/DL
TROPONIN I SERPL DL<=0.01 NG/ML-MCNC: 0.03 NG/ML
TROPONIN I SERPL DL<=0.01 NG/ML-MCNC: 0.04 NG/ML
WBC # BLD AUTO: 19.4 K/UL

## 2018-09-12 PROCEDURE — 96361 HYDRATE IV INFUSION ADD-ON: CPT

## 2018-09-12 PROCEDURE — 85025 COMPLETE CBC W/AUTO DIFF WBC: CPT

## 2018-09-12 PROCEDURE — 84484 ASSAY OF TROPONIN QUANT: CPT

## 2018-09-12 PROCEDURE — 87040 BLOOD CULTURE FOR BACTERIA: CPT | Mod: 59

## 2018-09-12 PROCEDURE — 36415 COLL VENOUS BLD VENIPUNCTURE: CPT

## 2018-09-12 PROCEDURE — 25000003 PHARM REV CODE 250: Performed by: EMERGENCY MEDICINE

## 2018-09-12 PROCEDURE — 63600175 PHARM REV CODE 636 W HCPCS: Performed by: NURSE PRACTITIONER

## 2018-09-12 PROCEDURE — 83605 ASSAY OF LACTIC ACID: CPT

## 2018-09-12 PROCEDURE — 20000000 HC ICU ROOM

## 2018-09-12 PROCEDURE — 80048 BASIC METABOLIC PNL TOTAL CA: CPT

## 2018-09-12 PROCEDURE — 94640 AIRWAY INHALATION TREATMENT: CPT

## 2018-09-12 PROCEDURE — 99900035 HC TECH TIME PER 15 MIN (STAT)

## 2018-09-12 PROCEDURE — 94002 VENT MGMT INPAT INIT DAY: CPT

## 2018-09-12 PROCEDURE — 99285 EMERGENCY DEPT VISIT HI MDM: CPT | Mod: 25

## 2018-09-12 PROCEDURE — 83540 ASSAY OF IRON: CPT

## 2018-09-12 PROCEDURE — 83605 ASSAY OF LACTIC ACID: CPT | Mod: 91

## 2018-09-12 PROCEDURE — 27000221 HC OXYGEN, UP TO 24 HOURS

## 2018-09-12 PROCEDURE — 25000242 PHARM REV CODE 250 ALT 637 W/ HCPCS: Performed by: HOSPITALIST

## 2018-09-12 PROCEDURE — 96366 THER/PROPH/DIAG IV INF ADDON: CPT

## 2018-09-12 PROCEDURE — 63600175 PHARM REV CODE 636 W HCPCS: Performed by: HOSPITALIST

## 2018-09-12 PROCEDURE — 99900026 HC AIRWAY MAINTENANCE (STAT)

## 2018-09-12 PROCEDURE — 63600175 PHARM REV CODE 636 W HCPCS: Performed by: EMERGENCY MEDICINE

## 2018-09-12 PROCEDURE — 25000003 PHARM REV CODE 250: Performed by: NURSE PRACTITIONER

## 2018-09-12 PROCEDURE — 5A1945Z RESPIRATORY VENTILATION, 24-96 CONSECUTIVE HOURS: ICD-10-PCS | Performed by: HOSPITALIST

## 2018-09-12 PROCEDURE — 25000242 PHARM REV CODE 250 ALT 637 W/ HCPCS: Performed by: EMERGENCY MEDICINE

## 2018-09-12 PROCEDURE — 25000003 PHARM REV CODE 250: Performed by: HOSPITALIST

## 2018-09-12 PROCEDURE — 84484 ASSAY OF TROPONIN QUANT: CPT | Mod: 91

## 2018-09-12 PROCEDURE — 96365 THER/PROPH/DIAG IV INF INIT: CPT

## 2018-09-12 RX ORDER — LANOLIN ALCOHOL/MO/W.PET/CERES
800 CREAM (GRAM) TOPICAL
Status: DISCONTINUED | OUTPATIENT
Start: 2018-09-12 | End: 2018-09-14 | Stop reason: HOSPADM

## 2018-09-12 RX ORDER — SODIUM CHLORIDE 0.9 % (FLUSH) 0.9 %
5 SYRINGE (ML) INJECTION
Status: DISCONTINUED | OUTPATIENT
Start: 2018-09-12 | End: 2018-09-14 | Stop reason: HOSPADM

## 2018-09-12 RX ORDER — SODIUM,POTASSIUM PHOSPHATES 280-250MG
2 POWDER IN PACKET (EA) ORAL
Status: DISCONTINUED | OUTPATIENT
Start: 2018-09-12 | End: 2018-09-14 | Stop reason: HOSPADM

## 2018-09-12 RX ORDER — ALBUTEROL SULFATE 2.5 MG/.5ML
5 SOLUTION RESPIRATORY (INHALATION)
Status: COMPLETED | OUTPATIENT
Start: 2018-09-12 | End: 2018-09-12

## 2018-09-12 RX ORDER — IBUPROFEN 200 MG
16 TABLET ORAL
Status: DISCONTINUED | OUTPATIENT
Start: 2018-09-12 | End: 2018-09-14 | Stop reason: HOSPADM

## 2018-09-12 RX ORDER — AMLODIPINE BESYLATE 5 MG/1
10 TABLET ORAL DAILY
Status: DISCONTINUED | OUTPATIENT
Start: 2018-09-13 | End: 2018-09-14 | Stop reason: HOSPADM

## 2018-09-12 RX ORDER — IPRATROPIUM BROMIDE 0.5 MG/2.5ML
500 SOLUTION RESPIRATORY (INHALATION) 4 TIMES DAILY
COMMUNITY

## 2018-09-12 RX ORDER — AMLODIPINE BESYLATE 10 MG/1
10 TABLET ORAL DAILY
COMMUNITY

## 2018-09-12 RX ORDER — CITALOPRAM 20 MG/1
20 TABLET, FILM COATED ORAL DAILY
COMMUNITY

## 2018-09-12 RX ORDER — METOPROLOL TARTRATE 25 MG/1
25 TABLET, FILM COATED ORAL 2 TIMES DAILY
COMMUNITY

## 2018-09-12 RX ORDER — SODIUM CHLORIDE 0.9 % (FLUSH) 0.9 %
3 SYRINGE (ML) INJECTION
Status: DISCONTINUED | OUTPATIENT
Start: 2018-09-12 | End: 2018-09-14 | Stop reason: HOSPADM

## 2018-09-12 RX ORDER — CITALOPRAM 10 MG/1
20 TABLET ORAL DAILY
Status: DISCONTINUED | OUTPATIENT
Start: 2018-09-13 | End: 2018-09-14 | Stop reason: HOSPADM

## 2018-09-12 RX ORDER — ENOXAPARIN SODIUM 100 MG/ML
40 INJECTION SUBCUTANEOUS EVERY 24 HOURS
Status: DISCONTINUED | OUTPATIENT
Start: 2018-09-12 | End: 2018-09-12 | Stop reason: SDUPTHER

## 2018-09-12 RX ORDER — MORPHINE SULFATE 2 MG/ML
2 INJECTION, SOLUTION INTRAMUSCULAR; INTRAVENOUS ONCE
Status: COMPLETED | OUTPATIENT
Start: 2018-09-12 | End: 2018-09-12

## 2018-09-12 RX ORDER — PREDNISONE 20 MG/1
40 TABLET ORAL DAILY
Status: DISCONTINUED | OUTPATIENT
Start: 2018-09-13 | End: 2018-09-14 | Stop reason: HOSPADM

## 2018-09-12 RX ORDER — CALCITRIOL 0.25 UG/1
0.25 CAPSULE ORAL DAILY
Status: DISCONTINUED | OUTPATIENT
Start: 2018-09-13 | End: 2018-09-14 | Stop reason: HOSPADM

## 2018-09-12 RX ORDER — GLUCAGON 1 MG
1 KIT INJECTION
Status: DISCONTINUED | OUTPATIENT
Start: 2018-09-12 | End: 2018-09-14 | Stop reason: HOSPADM

## 2018-09-12 RX ORDER — IBUPROFEN 200 MG
24 TABLET ORAL
Status: DISCONTINUED | OUTPATIENT
Start: 2018-09-12 | End: 2018-09-14 | Stop reason: HOSPADM

## 2018-09-12 RX ORDER — ALPRAZOLAM 0.25 MG/1
0.25 TABLET ORAL 2 TIMES DAILY PRN
COMMUNITY

## 2018-09-12 RX ORDER — FAMOTIDINE 20 MG/1
20 TABLET, FILM COATED ORAL 2 TIMES DAILY
COMMUNITY

## 2018-09-12 RX ORDER — POTASSIUM CHLORIDE 20 MEQ/15ML
40 SOLUTION ORAL
Status: DISCONTINUED | OUTPATIENT
Start: 2018-09-12 | End: 2018-09-14 | Stop reason: HOSPADM

## 2018-09-12 RX ORDER — ACETAMINOPHEN 650 MG/20.3ML
650 LIQUID ORAL
Status: COMPLETED | OUTPATIENT
Start: 2018-09-12 | End: 2018-09-12

## 2018-09-12 RX ORDER — METOPROLOL TARTRATE 25 MG/1
25 TABLET, FILM COATED ORAL 2 TIMES DAILY
Status: DISCONTINUED | OUTPATIENT
Start: 2018-09-12 | End: 2018-09-14 | Stop reason: HOSPADM

## 2018-09-12 RX ORDER — ALBUTEROL SULFATE 2.5 MG/.5ML
2.5 SOLUTION RESPIRATORY (INHALATION)
Status: DISCONTINUED | OUTPATIENT
Start: 2018-09-12 | End: 2018-09-14 | Stop reason: HOSPADM

## 2018-09-12 RX ORDER — ALBUTEROL SULFATE 2.5 MG/.5ML
0.63 SOLUTION RESPIRATORY (INHALATION)
Status: DISCONTINUED | OUTPATIENT
Start: 2018-09-12 | End: 2018-09-12

## 2018-09-12 RX ORDER — SODIUM CHLORIDE 9 MG/ML
INJECTION, SOLUTION INTRAVENOUS CONTINUOUS
Status: DISCONTINUED | OUTPATIENT
Start: 2018-09-12 | End: 2018-09-13

## 2018-09-12 RX ORDER — ACETAMINOPHEN 325 MG/1
650 TABLET ORAL EVERY 8 HOURS PRN
Status: DISCONTINUED | OUTPATIENT
Start: 2018-09-12 | End: 2018-09-14 | Stop reason: HOSPADM

## 2018-09-12 RX ORDER — ALBUTEROL SULFATE 0.63 MG/3ML
0.63 SOLUTION RESPIRATORY (INHALATION) EVERY 6 HOURS PRN
COMMUNITY

## 2018-09-12 RX ORDER — ENOXAPARIN SODIUM 100 MG/ML
40 INJECTION SUBCUTANEOUS EVERY 24 HOURS
Status: DISCONTINUED | OUTPATIENT
Start: 2018-09-12 | End: 2018-09-14 | Stop reason: HOSPADM

## 2018-09-12 RX ORDER — ONDANSETRON 2 MG/ML
8 INJECTION INTRAMUSCULAR; INTRAVENOUS EVERY 8 HOURS PRN
Status: DISCONTINUED | OUTPATIENT
Start: 2018-09-12 | End: 2018-09-14 | Stop reason: HOSPADM

## 2018-09-12 RX ORDER — FAMOTIDINE 20 MG/1
20 TABLET, FILM COATED ORAL 2 TIMES DAILY
Status: DISCONTINUED | OUTPATIENT
Start: 2018-09-12 | End: 2018-09-14 | Stop reason: HOSPADM

## 2018-09-12 RX ADMIN — FAMOTIDINE 20 MG: 20 TABLET ORAL at 09:09

## 2018-09-12 RX ADMIN — ENOXAPARIN SODIUM 40 MG: 100 INJECTION SUBCUTANEOUS at 05:09

## 2018-09-12 RX ADMIN — VANCOMYCIN HYDROCHLORIDE 750 MG: 1 INJECTION, POWDER, LYOPHILIZED, FOR SOLUTION INTRAVENOUS at 05:09

## 2018-09-12 RX ADMIN — SODIUM CHLORIDE, SODIUM LACTATE, POTASSIUM CHLORIDE, AND CALCIUM CHLORIDE 1000 ML: .6; .31; .03; .02 INJECTION, SOLUTION INTRAVENOUS at 12:09

## 2018-09-12 RX ADMIN — PIPERACILLIN SODIUM AND TAZOBACTAM SODIUM 4.5 G: 4; .5 INJECTION, POWDER, FOR SOLUTION INTRAVENOUS at 02:09

## 2018-09-12 RX ADMIN — Medication 2 MG: at 06:09

## 2018-09-12 RX ADMIN — ALBUTEROL SULFATE 5 MG: 2.5 SOLUTION RESPIRATORY (INHALATION) at 12:09

## 2018-09-12 RX ADMIN — SODIUM CHLORIDE: 0.9 INJECTION, SOLUTION INTRAVENOUS at 05:09

## 2018-09-12 RX ADMIN — PIPERACILLIN SODIUM AND TAZOBACTAM SODIUM 4.5 G: 4; .5 INJECTION, POWDER, FOR SOLUTION INTRAVENOUS at 11:09

## 2018-09-12 RX ADMIN — ALBUTEROL SULFATE 2.5 MG: 2.5 SOLUTION RESPIRATORY (INHALATION) at 07:09

## 2018-09-12 RX ADMIN — METOPROLOL TARTRATE 25 MG: 25 TABLET ORAL at 09:09

## 2018-09-12 RX ADMIN — ACETAMINOPHEN 650 MG: 650 SOLUTION ORAL at 01:09

## 2018-09-12 NOTE — ASSESSMENT & PLAN NOTE
Peg care per nursing  Resume home tube feedings.   Check CMP in am to evaluate for malnutrition.   Consult dietitian

## 2018-09-12 NOTE — SUBJECTIVE & OBJECTIVE
Past Medical History:   Diagnosis Date    Arthritis     CIDP (chronic inflammatory demyelinating polyneuropathy)     Hypertension     Hypoparathyroidism     Dr. Rahman    Thyroid disease        Past Surgical History:   Procedure Laterality Date    ESOPHAGOGASTRODUODENOSCOPY (EGD) N/A 12/20/2016    Performed by Mark Chavez MD at [facility] ENDO    parathyroid removal      PARATHYROIDECTOMY Bilateral 08/2017    PEG W/TRACHEOSTOMY PLACEMENT      TUBAL LIGATION         Review of patient's allergies indicates:  No Known Allergies    No current facility-administered medications on file prior to encounter.      Current Outpatient Medications on File Prior to Encounter   Medication Sig    albuterol (ACCUNEB) 0.63 mg/3 mL Nebu Take 0.63 mg by nebulization every 6 (six) hours as needed. Rescue    ALPRAZolam (XANAX) 0.25 MG tablet Take 0.25 mg by mouth 2 (two) times daily as needed for Anxiety.    amLODIPine (NORVASC) 10 MG tablet Take 10 mg by mouth once daily.    calcitRIOL (ROCALTROL) 0.25 MCG Cap Take 0.25 mcg by mouth 2 (two) times daily.     citalopram (CELEXA) 20 MG tablet Take 20 mg by mouth once daily.    cyanocobalamin 1,000 mcg/mL injection Inject 1 mL (1,000 mcg total) into the skin As instructed. 1000 mcg (1 ml) subcutaneous injection daily x 5 days, then weekly x 4 weeks, then monthly thereafter (Patient taking differently: Inject 1,000 mcg into the skin every 30 days. )    famotidine (PEPCID) 20 MG tablet Take 20 mg by mouth 2 (two) times daily.    ipratropium (ATROVENT) 0.02 % nebulizer solution Take 500 mcg by nebulization 4 (four) times daily. Rescue    metoprolol tartrate (LOPRESSOR) 25 MG tablet Take 25 mg by mouth 2 (two) times daily.    predniSONE (DELTASONE) 20 MG tablet Take 40 mg by mouth once daily.     SYNTHROID 175 mcg tablet Take 175 mcg by mouth once daily.     [DISCONTINUED] albuterol 90 mcg/actuation inhaler Inhale 2 puffs into the lungs every 6 (six) hours as needed.     [DISCONTINUED] calcium carb and citrate-vitD3 (CITRACAL + D SLOW RELEASE) 600 mg calcium- 500 unit TbSR Take 1 tablet by mouth 3 (three) times daily.    [DISCONTINUED] doxycycline (VIBRAMYCIN) 100 MG Cap Take 1 capsule (100 mg total) by mouth every 12 (twelve) hours.    [DISCONTINUED] liothyronine (CYTOMEL) 5 MCG Tab Take 25 mcg by mouth once daily.    [DISCONTINUED] pantoprazole (PROTONIX) 40 MG tablet Take 40 mg by mouth once daily.    [DISCONTINUED] valsartan (DIOVAN) 80 MG tablet Take 1 tablet (80 mg total) by mouth once daily.     Family History     Problem Relation (Age of Onset)    Breast cancer Paternal Aunt    Diabetes Father, Maternal Grandmother    Heart disease Mother    Hypertension Father, Maternal Grandmother    Parkinsonism Paternal Aunt    Stomach cancer Father, Maternal Aunt, Paternal Aunt        Tobacco Use    Smoking status: Never Smoker    Smokeless tobacco: Never Used   Substance and Sexual Activity    Alcohol use: No    Drug use: No    Sexual activity: Yes     Partners: Male     Review of Systems   Constitutional: Positive for fatigue and fever. Negative for diaphoresis.   HENT: Negative for congestion and ear pain.    Eyes: Negative for photophobia and visual disturbance.   Respiratory: Negative for cough and shortness of breath.    Cardiovascular: Positive for chest pain. Negative for leg swelling.   Gastrointestinal: Positive for abdominal pain (epigastric). Negative for abdominal distention, constipation, nausea and vomiting.   Endocrine: Negative for polyphagia and polyuria.   Genitourinary: Negative for dysuria and frequency.        Incontinence    Musculoskeletal: Positive for gait problem. Negative for back pain and joint swelling.   Skin: Negative for pallor and rash.        Sacral decub   Neurological: Positive for weakness and numbness. Negative for speech difficulty.        Diffuse muscle weakness/quadraplegia   Psychiatric/Behavioral: Negative for confusion. The patient  is not nervous/anxious.      Objective:     Vital Signs (Most Recent):  Temp: 98.8 °F (37.1 °C) (09/12/18 1258)  Pulse: 77 (09/12/18 1535)  Resp: 14 (09/12/18 1221)  BP: 119/72 (09/12/18 1406)  SpO2: 100 % (09/12/18 1535) Vital Signs (24h Range):  Temp:  [98.8 °F (37.1 °C)-99.1 °F (37.3 °C)] 98.8 °F (37.1 °C)  Pulse:  [] 77  Resp:  [14] 14  SpO2:  [92 %-100 %] 100 %  BP: (119-131)/(72-78) 119/72     Weight: 59.9 kg (132 lb)  Body mass index is 20.07 kg/m².    Physical Exam   Constitutional: She is oriented to person, place, and time. She appears well-developed.   Thin female   HENT:   Head: Normocephalic and atraumatic.   Right Ear: External ear normal.   Left Ear: External ear normal.   Mouth/Throat: Oropharynx is clear and moist.   Eyes: Conjunctivae and EOM are normal. Pupils are equal, round, and reactive to light.   Neck: Normal range of motion. Neck supple.   Trach in place. No erythema noted to site.   Cardiovascular: Normal rate, regular rhythm, normal heart sounds and intact distal pulses.   Pulmonary/Chest: Effort normal. No respiratory distress. She has no wheezes.   Trach to vent.  Rhonchi at base. No wheezes noted   Abdominal: Soft. Bowel sounds are normal.   Peg intact to upper abdomen. No erythema to site.   + mild epigastric tenderness with palpation   Musculoskeletal: She exhibits no tenderness.   Quadriplegia. non tender to joints   Neurological: She is alert and oriented to person, place, and time. She displays abnormal reflex. A sensory deficit is present. Coordination abnormal.   Answers questions appropriately, mouths words.  Diffuse muscle weakness to all groups 2/5.    Skin: Skin is warm and dry. No erythema.   Sacral pressure ulcer. Stage 3. See nursing notes  Heel protectors in place.    Psychiatric: She has a normal mood and affect. Her behavior is normal. Judgment normal.   Nursing note and vitals reviewed.        CRANIAL NERVES     CN III, IV, VI   Pupils are equal, round, and  reactive to light.  Extraocular motions are normal.        Significant Labs:   BMP:   Recent Labs   Lab  09/12/18   1222   GLU  124*   NA  141   K  3.2*   CL  105   CO2  25   BUN  14   CREATININE  0.4*   CALCIUM  9.1     CBC:   Recent Labs   Lab  09/12/18   1222   WBC  19.40*   HGB  10.8*   HCT  32.5*   PLT  483*       Significant Imaging: I have reviewed and interpreted all pertinent imaging results/findings within the past 24 hours.     CXR BLL pneumonia

## 2018-09-12 NOTE — PLAN OF CARE
09/12/18 1221   Patient Assessment/Suction   Level of Consciousness (AVPU) alert   $ Suction Charges Inline Suction Procedure Stat Charge   Sputum Amount large   Sputum Color white-yellow   Sputum Consistency thick   PRE-TX-O2-ETCO2   O2 Device (Oxygen Therapy) ventilator   SpO2 98 %   Pulse 107   Resp 14   Aerosol Therapy   $ Aerosol Therapy Charges Aerosol Treatment   Respiratory Treatment Status given   SVN/Inhaler Treatment Route in-line   Position During Treatment HOB at 45 degrees   Patient Tolerance good   Post-Treatment   Post-treatment Heart Rate (beats/min) 104   Post-treatment Resp Rate (breaths/min) 16   All Fields Breath Sounds unchanged

## 2018-09-12 NOTE — PLAN OF CARE
09/12/18 1656   PRE-TX-O2-ETCO2   Oxygen Concentration (%) 40   SpO2 (!) 92 %   Pulse 71   Resp (!) 23   Vent Select   Conventional Vent Y   Charged w/in last 24h YES   Preset Conventional Ventilator Settings   Vent Type    Ventilation Type VC   Vent Mode A/C   Set Rate 14 bmp   Vt Set 500 mL   PEEP/CPAP 5 cmH20   Pressure Support 0 cmH20   Waveform RAMP   Peak Flow 65 L/min   Set Inspiratory Pressure 0 cmH20   Insp Time 0 Sec(s)   Plateau Set/Insp. Hold (sec) 0   Insp Rise Time  0 %   Trigger Sensitivity Flow/I-Trigger 3 L/min   P High 0 cm H2O   P Low 0 cm H2O   T High 0 sec   T Low 0 sec   Patient Ventilator Parameters   Resp Rate Total 22 br/min   Peak Airway Pressure 37 cmH2O   Mean Airway Pressure 13 cmH20   Plateau Pressure 0 cmH20   Exhaled Vt 616 mL   Total Ve 11.5 mL   Spont Ve 0 L   I:E Ratio Measured 1:4.10   Conventional Ventilator Alarms   Ve High Alarm 20 L/min   Resp Rate High Alarm 45 br/min   Press High Alarm 46 cmH2O   Apnea Rate 10   Apnea Volume (mL) 620 mL   Apnea Oxygen Concentration  100   Apnea Flow Rate (L/min) 74   T Apnea 20 sec(s)   Ready to Wean/Extubation Screen   FIO2<=50 (chart decimal) 0.4   MV<16L (chart vol.) 11.5   PEEP <=8 (chart #) 5   Ready to Wean Parameters   F/VT Ratio<105 (RSBI) (!) 37.34   ventilated via trach alarms on and functioning ambu, mask, and sx at bedside

## 2018-09-12 NOTE — MEDICAL/APP STUDENT
"  History     Chief Complaint   Patient presents with    Fever     family reports 100.4    Chest Pain     Reta Styles is a 58 year old female with a past medical history of CIPD, HTN, and thyroid disease that presents to the ED with complaints of chest pain and a fever. Patient has a home ventilator due to her CIPD and is currently on her home settings and denies any shortness of breath. Pt's sister states the fever began a couple days ago, with a max temp of 100.4, which was treated with Tylenol and temporarily improved. Pt states the chest pain began this morning and describes it as a "burning" pain in the epigastric region that radiates to the mid sternal area. She denies any nausea, vomiting, or diaphoresis.     WBC count elevated and chest x-ray shows bilateral infiltrates that are consistent with pneumonia.         Fever    Associated symptoms include chest pain and coughing. Pertinent negatives include no diarrhea, headaches, nausea, rash, sore throat, urinary pain or vomiting.   Chest Pain    Associated symptoms include a cough and a fever. Pertinent negatives include no diaphoresis, dizziness, headaches, nausea, palpitations, shortness of breath or vomiting.       Past Medical History:   Diagnosis Date    Arthritis     CIDP (chronic inflammatory demyelinating polyneuropathy)     Hypertension     Hypoparathyroidism     Dr. Rahman    Thyroid disease        Past Surgical History:   Procedure Laterality Date    ESOPHAGOGASTRODUODENOSCOPY (EGD) N/A 12/20/2016    Performed by [facility] at [facility] ENDO    parathyroid removal      PARATHYROIDECTOMY Bilateral 08/2017    PEG W/TRACHEOSTOMY PLACEMENT      TUBAL LIGATION         Family History   Problem Relation Age of Onset    Hypertension Father     Stomach cancer Father     Diabetes Father     Diabetes Maternal Grandmother     Hypertension Maternal Grandmother     Stomach cancer Maternal Aunt     Stomach cancer Paternal Aunt     " Breast cancer Paternal Aunt     Parkinsonism Paternal Aunt     Heart disease Mother        Social History     Tobacco Use    Smoking status: Never Smoker    Smokeless tobacco: Never Used   Substance Use Topics    Alcohol use: No    Drug use: No       Review of Systems   Constitutional: Positive for fever. Negative for chills and diaphoresis.   HENT: Negative for hearing loss and sore throat.    Eyes: Negative for photophobia and discharge.   Respiratory: Positive for cough. Negative for shortness of breath.    Cardiovascular: Positive for chest pain. Negative for palpitations and leg swelling.   Gastrointestinal: Negative for diarrhea, nausea and vomiting.   Genitourinary: Negative for difficulty urinating, dysuria and flank pain.   Skin: Positive for wound. Negative for pallor and rash.   Neurological: Negative for dizziness and headaches.   Hematological: Negative for adenopathy. Does not bruise/bleed easily.   Psychiatric/Behavioral: Negative for confusion. The patient is not nervous/anxious.        Physical Exam   /72   Pulse 78   Temp 98.8 °F (37.1 °C) (Oral)   Resp 14   Wt 59.9 kg (132 lb)   SpO2 100%   Breastfeeding? No   BMI 20.07 kg/m²     Physical Exam    Constitutional: Vital signs are normal. She appears well-developed and well-nourished.   HENT:   Head: Normocephalic and atraumatic.   Right Ear: External ear normal.   Left Ear: External ear normal.   Nose: Nose normal.   Mouth/Throat: Oropharynx is clear and moist.   Eyes: Conjunctivae and EOM are normal. Pupils are equal, round, and reactive to light.   Neck: Normal range of motion. Neck supple.   Tracheostomy in place; no swelling or erythema present   Cardiovascular: Normal rate, regular rhythm, normal heart sounds and intact distal pulses.   Pulmonary/Chest: No respiratory distress. She has no wheezes. She has rhonchi.   Trach to vent; no respiratory distress noted, mild bilateral rhonchi present   Abdominal: Soft. Bowel sounds  are normal. She exhibits no distension. There is tenderness.   Epigastric tenderness   Musculoskeletal: She exhibits no edema or tenderness.   Limited ROM due to neurologic disorder   Neurological: She is alert and oriented to person, place, and time.   Appropriately answers questions; Diffuse muscle weakness in all extremities; 2/5 strength   Skin: Skin is warm and dry.   Sacral wound noted upon arrival; see nurses notes   Psychiatric: She has a normal mood and affect. Her behavior is normal. Judgment and thought content normal.         ED Course     1. Ventilator Acquired Pneumonia     A. Treat with IV ATB   B. Continue home ventilator settings   C. Control temp with PRN Tylenol  2. Gastritis    A. Hold enteral feedings   B. Treat with Pepcid  3. CIPD    A. Pt currently receiving IVIG at [facility], her last dose being 8/20  4. HTN   A. Continue home meds as ordered  5. Sacral pressure ulcer   A. Consult ET for wound care management   B. Place pt on strict turning regimen  6. Chronic respiratory failure   A.   Quadriplegia  Anemia  Hypokalemia

## 2018-09-12 NOTE — HPI
"Reta Styles is a 58 year old female with a past medical history of CIPD, quadraplegia with trach and peg, HTN, and thyroid disease that presents to the ED with complaints of chest pain and a fever. Patient has a home ventilator due to her CIPD and is currently on her home settings and denies any shortness of breath. Pt's sister states the fever began a couple days ago, with a max temp of 100.4, which was treated with Tylenol and temporarily improved. Pt states the chest pain began this morning and describes it as a "burning" pain in the epigastric region that radiates to the mid sternal area. She denies any nausea, vomiting, or diaphoresis.      WBC count elevated and chest x-ray shows bilateral infiltrates that are consistent with pneumonia.       "

## 2018-09-12 NOTE — CONSULTS
Reta Styles [id number] is a 58 y.o. female who has been consulted for vancomycin dosing.    The patient has the following labs:     Date Creatinine (mg/dl)    BUN WBC Count   9/12/2018 Estimated Creatinine Clearance: 129.9 mL/min (A) (based on SCr of 0.4 mg/dL (L)). Lab Results   Component Value Date    BUN 14 09/12/2018     Lab Results   Component Value Date    WBC 19.40 (H) 09/12/2018        Current weight is 55 kg (121 lb 4.1 oz)      The patient will be started on vancomycin at a dose of 750 mg every 12 hours .  The vancomycin trough has been ordered for 9/13 at 1700.  Target trough goal is 15 to 20.    Patient will be followed by pharmacy for changes in renal function, toxicity, and efficacy.   Thank you for allowing us to participate in this patient's care.     Vignesh Garcia

## 2018-09-12 NOTE — H&P
"[facility] Medicine  History & Physical    Patient Name: Reta Styles  MRN: [id number]  Admission Date: 9/12/2018  Attending Physician: Sadie Mai NP  Primary Care Provider: Anish Shay MD         Patient information was obtained from relative(s) and ER records.     Subjective:     Principal Problem:VAP (ventilator-associated pneumonia)    Chief Complaint:   Chief Complaint   Patient presents with    Fever     family reports 100.4    Chest Pain        HPI: Reta Styles is a 58 year old female with a past medical history of CIPD, quadraplegia with trach and peg, HTN, and thyroid disease that presents to the ED with complaints of chest pain and a fever. Patient has a home ventilator due to her CIPD and is currently on her home settings and denies any shortness of breath. Pt's sister states the fever began a couple days ago, with a max temp of 100.4, which was treated with Tylenol and temporarily improved. Pt states the chest pain began this morning and describes it as a "burning" pain in the epigastric region that radiates to the mid sternal area. She denies any nausea, vomiting, or diaphoresis.      WBC count elevated and chest x-ray shows bilateral infiltrates that are consistent with pneumonia.         Past Medical History:   Diagnosis Date    Arthritis     CIDP (chronic inflammatory demyelinating polyneuropathy)     Hypertension     Hypoparathyroidism     Dr. Rahman    Thyroid disease        Past Surgical History:   Procedure Laterality Date    ESOPHAGOGASTRODUODENOSCOPY (EGD) N/A 12/20/2016    Performed by Mark Chavez [facility] at [facility] ENDO    parathyroid removal      PARATHYROIDECTOMY Bilateral 08/2017    PEG W/TRACHEOSTOMY PLACEMENT      TUBAL LIGATION         Review of patient's allergies indicates:  No Known Allergies    No current facility-administered medications on file prior to encounter.      Current Outpatient Medications on File Prior to " Encounter   Medication Sig    albuterol (ACCUNEB) 0.63 mg/3 mL Nebu Take 0.63 mg by nebulization every 6 (six) hours as needed. Rescue    ALPRAZolam (XANAX) 0.25 MG tablet Take 0.25 mg by mouth 2 (two) times daily as needed for Anxiety.    amLODIPine (NORVASC) 10 MG tablet Take 10 mg by mouth once daily.    calcitRIOL (ROCALTROL) 0.25 MCG Cap Take 0.25 mcg by mouth 2 (two) times daily.     citalopram (CELEXA) 20 MG tablet Take 20 mg by mouth once daily.    cyanocobalamin 1,000 mcg/mL injection Inject 1 mL (1,000 mcg total) into the skin As instructed. 1000 mcg (1 ml) subcutaneous injection daily x 5 days, then weekly x 4 weeks, then monthly thereafter (Patient taking differently: Inject 1,000 mcg into the skin every 30 days. )    famotidine (PEPCID) 20 MG tablet Take 20 mg by mouth 2 (two) times daily.    ipratropium (ATROVENT) 0.02 % nebulizer solution Take 500 mcg by nebulization 4 (four) times daily. Rescue    metoprolol tartrate (LOPRESSOR) 25 MG tablet Take 25 mg by mouth 2 (two) times daily.    predniSONE (DELTASONE) 20 MG tablet Take 40 mg by mouth once daily.     SYNTHROID 175 mcg tablet Take 175 mcg by mouth once daily.     [DISCONTINUED] albuterol 90 mcg/actuation inhaler Inhale 2 puffs into the lungs every 6 (six) hours as needed.    [DISCONTINUED] calcium carb and citrate-vitD3 (CITRACAL + D SLOW RELEASE) 600 mg calcium- 500 unit TbSR Take 1 tablet by mouth 3 (three) times daily.    [DISCONTINUED] doxycycline (VIBRAMYCIN) 100 MG Cap Take 1 capsule (100 mg total) by mouth every 12 (twelve) hours.    [DISCONTINUED] liothyronine (CYTOMEL) 5 MCG Tab Take 25 mcg by mouth once daily.    [DISCONTINUED] pantoprazole (PROTONIX) 40 MG tablet Take 40 mg by mouth once daily.    [DISCONTINUED] valsartan (DIOVAN) 80 MG tablet Take 1 tablet (80 mg total) by mouth once daily.     Family History     Problem Relation (Age of Onset)    Breast cancer Paternal Aunt    Diabetes Father, Maternal Grandmother     Heart disease Mother    Hypertension Father, Maternal Grandmother    Parkinsonism Paternal Aunt    Stomach cancer Father, Maternal Aunt, Paternal Aunt        Tobacco Use    Smoking status: Never Smoker    Smokeless tobacco: Never Used   Substance and Sexual Activity    Alcohol use: No    Drug use: No    Sexual activity: Yes     Partners: Male     Review of Systems   Constitutional: Positive for fatigue and fever. Negative for diaphoresis.   HENT: Negative for congestion and ear pain.    Eyes: Negative for photophobia and visual disturbance.   Respiratory: Negative for cough and shortness of breath.    Cardiovascular: Positive for chest pain. Negative for leg swelling.   Gastrointestinal: Positive for abdominal pain (epigastric). Negative for abdominal distention, constipation, nausea and vomiting.   Endocrine: Negative for polyphagia and polyuria.   Genitourinary: Negative for dysuria and frequency.        Incontinence    Musculoskeletal: Positive for gait problem. Negative for back pain and joint swelling.   Skin: Negative for pallor and rash.        Sacral decub   Neurological: Positive for weakness and numbness. Negative for speech difficulty.        Diffuse muscle weakness/quadraplegia   Psychiatric/Behavioral: Negative for confusion. The patient is not nervous/anxious.      Objective:     Vital Signs (Most Recent):  Temp: 98.8 °F (37.1 °C) (09/12/18 1258)  Pulse: 77 (09/12/18 1535)  Resp: 14 (09/12/18 1221)  BP: 119/72 (09/12/18 1406)  SpO2: 100 % (09/12/18 1535) Vital Signs (24h Range):  Temp:  [98.8 °F (37.1 °C)-99.1 °F (37.3 °C)] 98.8 °F (37.1 °C)  Pulse:  [] 77  Resp:  [14] 14  SpO2:  [92 %-100 %] 100 %  BP: (119-131)/(72-78) 119/72     Weight: 59.9 kg (132 lb)  Body mass index is 20.07 kg/m².    Physical Exam   Constitutional: She is oriented to person, place, and time. She appears well-developed.   Thin female   HENT:   Head: Normocephalic and atraumatic.   Right Ear: External ear normal.    Left Ear: External ear normal.   Mouth/Throat: Oropharynx is clear and moist.   Eyes: Conjunctivae and EOM are normal. Pupils are equal, round, and reactive to light.   Neck: Normal range of motion. Neck supple.   Trach in place. No erythema noted to site.   Cardiovascular: Normal rate, regular rhythm, normal heart sounds and intact distal pulses.   Pulmonary/Chest: Effort normal. No respiratory distress. She has no wheezes.   Trach to vent.  Rhonchi at base. No wheezes noted   Abdominal: Soft. Bowel sounds are normal.   Peg intact to upper abdomen. No erythema to site.   + mild epigastric tenderness with palpation   Musculoskeletal: She exhibits no tenderness.   Quadriplegia. non tender to joints   Neurological: She is alert and oriented to person, place, and time. She displays abnormal reflex. A sensory deficit is present. Coordination abnormal.   Answers questions appropriately, mouths words.  Diffuse muscle weakness to all groups 2/5.    Skin: Skin is warm and dry. No erythema.   Sacral pressure ulcer. Stage 3. See nursing notes  Heel protectors in place.    Psychiatric: She has a normal mood and affect. Her behavior is normal. Judgment normal.   Nursing note and vitals reviewed.        CRANIAL NERVES     CN III, IV, VI   Pupils are equal, round, and reactive to light.  Extraocular motions are normal.        Significant Labs:   BMP:   Recent Labs   Lab  09/12/18   1222   GLU  124*   NA  141   K  3.2*   CL  105   CO2  25   BUN  14   CREATININE  0.4*   CALCIUM  9.1     CBC:   Recent Labs   Lab  09/12/18   1222   WBC  19.40*   HGB  10.8*   HCT  32.5*   PLT  483*       Significant Imaging: I have reviewed and interpreted all pertinent imaging results/findings within the past 24 hours.     CXR BLL pneumonia     Assessment/Plan:     * VAP (ventilator-associated pneumonia)    Initiated on IV Vanco and Zosyn  Possible due to aspiration.   No evidence of sepsis.  Trend lactic acid  Check blood and sputum  culture  Respiratory treatments          CIDP (chronic inflammatory demyelinating polyneuropathy)    Received IVIG monthly. Followed by neurology at [facility]  Last dose 8/20/2018          Iron deficiency anemia secondary to inadequate dietary iron intake    Monitor hgb/hct  Check iron studies.          Acute gastritis without hemorrhage    Contributing to epigastric/ chest pain.   Continue Pepcid  Trend troponin.            Quadriplegia    Due to CIDP          Chronic respiratory failure with hypoxia    Vent dependent.   Continue home ventilator settings.   monitor pulse oximetry          Ventilator dependent              Pressure ulcer of sacral region    Consult wound care. Turn every 2 hours.             Hypokalemia    Replace with KCL per peg.   Trend K          PEG (percutaneous endoscopic gastrostomy) status    Peg care per nursing  Resume home tube feedings.   Check CMP in am to evaluate for malnutrition.   Consult dietitian           Tracheostomy status    Trach care per nursing and RT          Acquired hypothyroidism    Chronic. Status post radiation for hyperthyroidism.  Resume home levothyroxine           Hypertension    Chronic. Resume home antihypertensive            VTE Risk Mitigation (From admission, onward)        Ordered     enoxaparin injection 40 mg  Daily      09/12/18 1629     IP VTE HIGH RISK PATIENT  Once      09/12/18 1629        Discussed POC with sister.   Time spent seeing patient( greater than 1/2 spent in direct contact) : 65 minutes    Sadie Mai NP  Department of Hospital Medicine   [facility]

## 2018-09-12 NOTE — ASSESSMENT & PLAN NOTE
Initiated on IV Vanco and Zosyn  Possible due to aspiration.   No evidence of sepsis.  Trend lactic acid  Check blood and sputum culture  Respiratory treatments

## 2018-09-12 NOTE — ED PROVIDER NOTES
Encounter Date: 9/12/2018    SCRIBE #1 NOTE: Casi LIAO am scribing for, and in the presence of, Dr Snider.       History     Chief Complaint   Patient presents with    Fever     family reports 100.4    Chest Pain     09/12/2018  11:36 AM        Reta Styles is a 58 y.o. female with a pmhx of HTN; chronic LE and UE weakness; pneumonia, presenting to the E.D. with complaints of fever and burning chest pain which began around 8AM this morning. Highest recorded temperature of 100.4 degrees F. 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 Last IVG treatment last month and  reports she is due for her treatment this month. Mild associated cough. Family denies vomiting, rash, dark stool or vomiting.       The history is provided by the patient and the spouse.     Review of patient's allergies indicates:  No Known Allergies  Past Medical History:   Diagnosis Date    Arthritis     CIDP (chronic inflammatory demyelinating polyneuropathy)     Hypertension     Hypoparathyroidism     Dr. Rahman    Thyroid disease      Past Surgical History:   Procedure Laterality Date    ESOPHAGOGASTRODUODENOSCOPY (EGD) N/A 12/20/2016    Per[facility] ENDO    parathyroid removal      PARATHYROIDECTOMY Bilateral 08/2017    PEG W/TRACHEOSTOMY PLACEMENT      TUBAL LIGATION       Family History   Problem Relation Age of Onset    Hypertension Father     Stomach cancer Father     Diabetes Father     Diabetes Maternal Grandmother     Hypertension Maternal Grandmother     Stomach cancer Maternal Aunt     Stomach cancer Paternal Aunt     Breast cancer Paternal Aunt     Parkinsonism Paternal Aunt     Heart disease Mother      Social History     Tobacco Use    Smoking status: Never Smoker    Smokeless tobacco: Never Used   Substance Use Topics    Alcohol use: No     Drug use: No     Review of Systems   Unable to perform ROS: Other (Tracheostomy/vent dependence)   Constitutional: Positive for fever.   Respiratory: Positive for cough.    Cardiovascular: Positive for chest pain.       Physical Exam     Initial Vitals   BP Pulse Resp Temp SpO2   09/12/18 1040 09/12/18 1040 09/12/18 1221 09/12/18 1040 09/12/18 1040   131/78 96 14 99.1 °F (37.3 °C) (!) 92 %      MAP       --                Physical Exam    Nursing note and vitals reviewed.  Constitutional: She appears well-developed and well-nourished. She is not diaphoretic. No distress.   HENT:   Head: Normocephalic.   Mouth/Throat: Oropharynx is clear and moist.   Eyes: Conjunctivae are normal.   Neck: Neck supple.   Cardiovascular: Normal rate, regular rhythm, normal heart sounds and intact distal pulses. Exam reveals no gallop and no friction rub.    No murmur heard.  Pulmonary/Chest: She has wheezes (bilateral expiratory). She has rhonchi (scattered). She has no rales.   Tracheostomy on ventilator with good synchrony with ventilator.    Abdominal: Soft. She exhibits no distension. There is no tenderness.   Musculoskeletal: Normal range of motion.   LE symmetric and non tender to palpation.    Neurological: She is alert and oriented to person, place, and time.   Quadriparesis of all 4 extremities, more so in lower extremities. Alert and follows commands. Able to respond as limited by ventilator.    Skin: No rash noted. No erythema.   Psychiatric: Her speech is normal.         ED Course   Procedures  Labs Reviewed   CBC W/ AUTO DIFFERENTIAL - Abnormal; Notable for the following components:       Result Value    WBC 19.40 (*)     RBC 3.57 (*)     Hemoglobin 10.8 (*)     Hematocrit 32.5 (*)     RDW 17.5 (*)     Platelets 483 (*)     MPV 7.6 (*)     Gran # (ANC) 17.2 (*)     Lymph # 0.9 (*)     Mono # 1.2 (*)     Gran% 88.7 (*)     Lymph% 4.6 (*)     All other components within normal limits   BASIC METABOLIC PANEL - Abnormal;  Notable for the following components:    Potassium 3.2 (*)     Glucose 124 (*)     Creatinine 0.4 (*)     All other components within normal limits   TROPONIN I - Abnormal; Notable for the following components:    Troponin I 0.036 (*)     All other components within normal limits   CULTURE, RESPIRATORY   LACTIC ACID, PLASMA          Imaging Results          X-Ray Chest 1 View (Final result)  Result time 09/12/18 12:48:49    Final result by Sylvie Marroquin MD (09/12/18 12:48:49)                 Impression:      New bibasilar infiltrates consistent with pneumonia.  Basilar location questions aspiration.  Tracheostomy cannula present.      Electronically signed by: Sylvie Marroquin MD  Date:    09/12/2018  Time:    12:48             Narrative:    EXAMINATION:  XR CHEST 1 VIEW    CLINICAL HISTORY:  CP, fever, r/o pna;    TECHNIQUE:  Single frontal view of the chest was performed.    COMPARISON:  3/6/2018    FINDINGS:  The interval insertion of a tracheostomy cannula which as visualized in the single AP projection appears in good position.  The cardiomediastinal silhouette is stable.  There is no pleural effusion on the right.  There could be very small left pleural effusion contributing to left basilar opacification    There are bibasilar infiltrates left slightly more so than right.  Basilar location questions aspiration pneumonia.                            (radiology reading, visualized by me)                Scribe Attestation:   Scribe #1: I performed the above scribed service and the documentation accurately describes the services I performed. I attest to the accuracy of the note.    I, Dr. Solitario Snider, personally performed the services described in this documentation. All medical record entries made by the scribe were at my direction and in my presence.  I have reviewed the chart and agree that the record reflects my personal performance and is accurate and complete. Solitario Snider MD.  11:31 PM  09/12/2018    Reta Styles is a 58 y.o. female presenting with ventilator associated pneumonia marked by bilateral lower lobe infiltrates in the setting of new fever and burning chest pain.  Very low suspicion for PE.  Nonspecific mild elevated troponin to be followed although low suspicion for ACS.  I do not think she requires emergent transfer for cardiac catheterization.  Less likely myocarditis.  She is oxygenating well on the ventilator and will be admitted to ICU.  I have discussed with hospital medicine with IV antibiotics for empiric coverage.  Lactic acid normal and I doubt sepsis.        ED Course as of Sep 12 2333   Wed Sep 12, 2018   1304 EKG:  NSR, rate of 99.  Normal intervals, L axis.  There are no acute ST or T wave changes suggestive of acute ischemia or infarction.  No new ST/T wave changes compared to last prior.  Motion artifact present.  [MR]      ED Course User Index  [MR] Solitario Snider MD     Clinical Impression:   The encounter diagnosis was VAP (ventilator-associated pneumonia).                             Solitario Snider MD  09/12/18 0870

## 2018-09-12 NOTE — ASSESSMENT & PLAN NOTE
Received IVIG monthly. [place] [facility]  Last dose 8/20/2018

## 2018-09-13 PROBLEM — E43 SEVERE MALNUTRITION: Status: ACTIVE | Noted: 2018-09-13

## 2018-09-13 LAB
ANION GAP SERPL CALC-SCNC: 8 MMOL/L
BASOPHILS # BLD AUTO: 0 K/UL
BASOPHILS NFR BLD: 0.2 %
BUN SERPL-MCNC: 14 MG/DL
CALCIUM SERPL-MCNC: 8.4 MG/DL
CHLORIDE SERPL-SCNC: 106 MMOL/L
CO2 SERPL-SCNC: 24 MMOL/L
CREAT SERPL-MCNC: 0.4 MG/DL
DIFFERENTIAL METHOD: ABNORMAL
EOSINOPHIL # BLD AUTO: 0.1 K/UL
EOSINOPHIL NFR BLD: 0.8 %
ERYTHROCYTE [DISTWIDTH] IN BLOOD BY AUTOMATED COUNT: 17.6 %
EST. GFR  (AFRICAN AMERICAN): >60 ML/MIN/1.73 M^2
EST. GFR  (NON AFRICAN AMERICAN): >60 ML/MIN/1.73 M^2
GLUCOSE SERPL-MCNC: 132 MG/DL
HCT VFR BLD AUTO: 27.1 %
HGB BLD-MCNC: 9 G/DL
LACTATE SERPL-SCNC: 0.9 MMOL/L
LACTATE SERPL-SCNC: 1.2 MMOL/L
LYMPHOCYTES # BLD AUTO: 1.5 K/UL
LYMPHOCYTES NFR BLD: 9.7 %
MCH RBC QN AUTO: 30.5 PG
MCHC RBC AUTO-ENTMCNC: 33.3 G/DL
MCV RBC AUTO: 92 FL
MONOCYTES # BLD AUTO: 1 K/UL
MONOCYTES NFR BLD: 6.6 %
NEUTROPHILS # BLD AUTO: 12.5 K/UL
NEUTROPHILS NFR BLD: 82.7 %
PLATELET # BLD AUTO: 395 K/UL
PMV BLD AUTO: 7.5 FL
POTASSIUM SERPL-SCNC: 3.3 MMOL/L
RBC # BLD AUTO: 2.96 M/UL
SODIUM SERPL-SCNC: 138 MMOL/L
WBC # BLD AUTO: 15.1 K/UL

## 2018-09-13 PROCEDURE — 63600175 PHARM REV CODE 636 W HCPCS: Performed by: NURSE PRACTITIONER

## 2018-09-13 PROCEDURE — 25000242 PHARM REV CODE 250 ALT 637 W/ HCPCS: Performed by: HOSPITALIST

## 2018-09-13 PROCEDURE — 25000003 PHARM REV CODE 250: Performed by: HOSPITALIST

## 2018-09-13 PROCEDURE — 97110 THERAPEUTIC EXERCISES: CPT

## 2018-09-13 PROCEDURE — 83605 ASSAY OF LACTIC ACID: CPT

## 2018-09-13 PROCEDURE — 63600175 PHARM REV CODE 636 W HCPCS: Performed by: EMERGENCY MEDICINE

## 2018-09-13 PROCEDURE — G8978 MOBILITY CURRENT STATUS: HCPCS | Mod: CN

## 2018-09-13 PROCEDURE — 85025 COMPLETE CBC W/AUTO DIFF WBC: CPT

## 2018-09-13 PROCEDURE — 94640 AIRWAY INHALATION TREATMENT: CPT

## 2018-09-13 PROCEDURE — 27000221 HC OXYGEN, UP TO 24 HOURS

## 2018-09-13 PROCEDURE — 63600175 PHARM REV CODE 636 W HCPCS: Performed by: HOSPITALIST

## 2018-09-13 PROCEDURE — 80048 BASIC METABOLIC PNL TOTAL CA: CPT

## 2018-09-13 PROCEDURE — 25000003 PHARM REV CODE 250: Performed by: NURSE PRACTITIONER

## 2018-09-13 PROCEDURE — 83605 ASSAY OF LACTIC ACID: CPT | Mod: 91

## 2018-09-13 PROCEDURE — 97802 MEDICAL NUTRITION INDIV IN: CPT

## 2018-09-13 PROCEDURE — 97162 PT EVAL MOD COMPLEX 30 MIN: CPT

## 2018-09-13 PROCEDURE — G8979 MOBILITY GOAL STATUS: HCPCS | Mod: CN

## 2018-09-13 PROCEDURE — 20000000 HC ICU ROOM

## 2018-09-13 PROCEDURE — 94003 VENT MGMT INPAT SUBQ DAY: CPT

## 2018-09-13 PROCEDURE — 99900026 HC AIRWAY MAINTENANCE (STAT)

## 2018-09-13 PROCEDURE — 36415 COLL VENOUS BLD VENIPUNCTURE: CPT

## 2018-09-13 RX ADMIN — POTASSIUM CHLORIDE 40 MEQ: 20 SOLUTION ORAL at 12:09

## 2018-09-13 RX ADMIN — ALBUTEROL SULFATE 2.5 MG: 2.5 SOLUTION RESPIRATORY (INHALATION) at 01:09

## 2018-09-13 RX ADMIN — ALBUTEROL SULFATE 2.5 MG: 2.5 SOLUTION RESPIRATORY (INHALATION) at 07:09

## 2018-09-13 RX ADMIN — CALCITRIOL 0.25 MCG: 0.25 CAPSULE, LIQUID FILLED ORAL at 09:09

## 2018-09-13 RX ADMIN — ACETAMINOPHEN 650 MG: 325 TABLET, FILM COATED ORAL at 12:09

## 2018-09-13 RX ADMIN — FAMOTIDINE 20 MG: 20 TABLET ORAL at 08:09

## 2018-09-13 RX ADMIN — VANCOMYCIN HYDROCHLORIDE 750 MG: 1 INJECTION, POWDER, LYOPHILIZED, FOR SOLUTION INTRAVENOUS at 05:09

## 2018-09-13 RX ADMIN — PIPERACILLIN SODIUM AND TAZOBACTAM SODIUM 4.5 G: 4; .5 INJECTION, POWDER, FOR SOLUTION INTRAVENOUS at 08:09

## 2018-09-13 RX ADMIN — PREDNISONE 40 MG: 20 TABLET ORAL at 09:09

## 2018-09-13 RX ADMIN — CITALOPRAM HYDROBROMIDE 20 MG: 10 TABLET ORAL at 09:09

## 2018-09-13 RX ADMIN — ENOXAPARIN SODIUM 40 MG: 100 INJECTION SUBCUTANEOUS at 05:09

## 2018-09-13 RX ADMIN — METOPROLOL TARTRATE 25 MG: 25 TABLET ORAL at 08:09

## 2018-09-13 RX ADMIN — MICONAZOLE NITRATE: 20 OINTMENT TOPICAL at 08:09

## 2018-09-13 RX ADMIN — AMLODIPINE BESYLATE 10 MG: 5 TABLET ORAL at 09:09

## 2018-09-13 RX ADMIN — CEFTRIAXONE 1 G: 1 INJECTION, SOLUTION INTRAVENOUS at 01:09

## 2018-09-13 RX ADMIN — FAMOTIDINE 20 MG: 20 TABLET ORAL at 09:09

## 2018-09-13 RX ADMIN — LEVOTHYROXINE SODIUM 175 MCG: 150 TABLET ORAL at 06:09

## 2018-09-13 RX ADMIN — ACETAMINOPHEN 650 MG: 325 TABLET, FILM COATED ORAL at 05:09

## 2018-09-13 RX ADMIN — POTASSIUM CHLORIDE 40 MEQ: 20 SOLUTION ORAL at 09:09

## 2018-09-13 NOTE — PLAN OF CARE
Problem: Nutrition, Enteral (Adult)  Intervention: Monitor/Manage Nutrition Support  Recommendation/Intervention:   1) Increase Isosource 1.5 flow rate to 45 mls/hr to meet 100% EEN/EPN. Flush with 36 mls/hr.  Hold if residuals >250 mls.   2) Check weight.   3) Malnutrition assessment completed. Meets ASPEN guidelines for severe malnutrition.   4) Current nutrition intake: Isosource 1.5 @ 35 mls/hr. Meets 71% EEN, 68% EPN)    Goals: 1) Pt will receive >=85% EEN during admit.  2) Pt will not lose >=1% weight during admit, goal is to gain.   Nutrition Goal Status: new  Communication of RD Recs: (POC, sticky note)

## 2018-09-13 NOTE — PROGRESS NOTES
This note also relates to the following rows which could not be included:  ETCO2 (mmHg) - Cannot attach notes to unvalidated device data     09/12/18 1918   Patient Assessment/Suction   Level of Consciousness (AVPU) alert   Respiratory Effort Normal;Mild   All Lung Fields Breath Sounds coarse;rhonchi   $ Suction Charges Inline Suction Procedure Stat Charge   Sputum Amount large   Sputum Color red-streaked;white   Sputum Consistency thick   PRE-TX-O2-ETCO2   O2 Device (Oxygen Therapy) ventilator   Oxygen Concentration (%) 40   SpO2 100 %   Pulse Oximetry Type Continuous   Pulse 68   Resp 17   Aerosol Therapy   $ Aerosol Therapy Charges Aerosol Treatment   Respiratory Treatment Status given   SVN/Inhaler Treatment Route mask   Position During Treatment HOB at 45 degrees   Patient Tolerance good   Post-Treatment   Post-treatment Heart Rate (beats/min) 68   Post-treatment Resp Rate (breaths/min) 17   All Fields Breath Sounds aeration increased   Vent Select   Conventional Vent Y   Charged w/in last 24h YES   Preset Conventional Ventilator Settings   Vent Type    Ventilation Type VC   Vent Mode A/C   Humidity HME   Set Rate 14 bmp   Vt Set 500 mL   PEEP/CPAP 5 cmH20   Pressure Support 0 cmH20   Waveform RAMP   Peak Flow 65 L/min   Set Inspiratory Pressure 0 cmH20   Insp Time 0 Sec(s)   Plateau Set/Insp. Hold (sec) 0   Insp Rise Time  0 %   Trigger Sensitivity Flow/I-Trigger 3 L/min   P High 0 cm H2O   P Low 0 cm H2O   T High 0 sec   T Low 0 sec   Patient Ventilator Parameters   Resp Rate Total 18 br/min   Peak Airway Pressure 23 cmH2O   Mean Airway Pressure 10 cmH20   Plateau Pressure 0 cmH20   Exhaled Vt 540 mL   Total Ve 9.74 mL   Spont Ve 0 L   I:E Ratio Measured 1:3.00   Conventional Ventilator Alarms   Alarms On Y   Ve High Alarm 27 L/min   Resp Rate High Alarm 45 br/min   Press High Alarm 46 cmH2O   Apnea Rate 10   Apnea Volume (mL) 620 mL   Apnea Oxygen Concentration  100   Apnea Flow Rate (L/min) 74   T  Apnea 20 sec(s)   Ready to Wean/Extubation Screen   FIO2<=50 (chart decimal) 0.4   MV<16L (chart vol.) 9.74   PEEP <=8 (chart #) 5   Ready to Wean Parameters   F/VT Ratio<105 (RSBI) (!) 31.48

## 2018-09-13 NOTE — CONSULTS
"  [facility]  Adult Nutrition  Consult Note    SUMMARY     Recommendations    Recommendation/Intervention:   1) Increase Isosource 1.5 flow rate to 45 mls/hr to meet 100% EEN/EPN. Flush with 36 mls/hr.  Hold if residuals >250 mls.   2) Check weight.   3) Malnutrition assessment completed. Meets ASPEN guidelines for severe malnutrition.   4) Current nutrition intake: Isosource 1.5 @ 35 mls/hr. Meets 71% EEN, 68% EPN)    Goals: 1) Pt will receive >=85% EEN during admit.  2) Pt will not lose >=1% weight during admit, goal is to gain.   Nutrition Goal Status: new  Communication of RD Recs: (POC, sticky note)    Reason for Assessment    Reason for Assessment: consult  1. VAP (ventilator-associated pneumonia)      Past Medical History:   Diagnosis Date    Arthritis     CIDP (chronic inflammatory demyelinating polyneuropathy)     Hypertension     Hypoparathyroidism     Dr. Rahman    Thyroid disease      General Information Comments: Admitted with vent associated pneumonia, CP, fever. Pt has trache/PEG. Has TF running.  S&S of malnutrition in PES statement.     Nutrition Risk Screen    Nutrition Risk Screen: dysphagia or difficulty swallowing    Nutrition/Diet History    Do you have any cultural, spiritual, Yarsanism conflicts, given your current situation?: no    Anthropometrics    Temp: 99.9 °F (37.7 °C)  Height Method: Stated  Height: 5' 9"  Height (inches): 69 in  Weight Method: Bed Scale  Weight: 55 kg (121 lb 4.1 oz)  Weight (lb): 121.25 lb  Ideal Body Weight (IBW), Female: 145 lb  % Ideal Body Weight, Female (lb): 83.62 lb  BMI (Calculated): 17.9  Usual Body Weight (UBW), k.3 kg(per chart review 18)  % Usual Body Weight: 91.4  % Weight Change From Usual Weight: -8.79 %       Lab/Procedures/Meds    Pertinent Labs Reviewed: reviewed  Lab Results   Component Value Date    ALBUMIN 4.4 2016     Lab Results   Component Value Date    WBC 15.10 (H) 2018     Lab Results "   Component Value Date    HGB 9.0 (L) 09/13/2018     Lab Results   Component Value Date    HCT 27.1 (L) 09/13/2018     No results for input(s): POCTGLUCOSE in the last 24 hours.  (continue to monitor 2/2 pt on prednisone)    Pertinent Medications Reviewed: reviewed  Pertinent Medications Comments: prednisone, calcitriol    Physical Findings/Assessment    Overall Physical Appearance: tetraplegia (quadriplegia)  Skin: (Stage 3 sacral decub)    Estimated/Assessed Needs    Weight Used For Calorie Calculations: 55 kg (121 lb 4.1 oz)  Energy Calorie Requirements (kcal): 5289-5396 (1.3-1.5 AF for wt gain)  Energy Need Method: [place]-St Jeor  Protein Requirements:   (1.2-2..0 per critical care pt)  Weight Used For Protein Calculations: 55 kg (121 lb 4.1 oz)     Fluid Need Method: RDA Method(or per MD)  RDA Method (mL): 1539  CHO Requirement: n/a      Nutrition Prescription Ordered    Current Diet Order: NPO  Current Nutrition Support Formula Ordered: Isosource 1.5  Current Nutrition Support Rate Ordered: 35 (ml)(mls/hr)    Evaluation of Received Nutrient/Fluid Intake    Enteral Calories (kcal): 1260  Enteral Protein (gm): 57  (used 83 gm as goal which is 1.5 gm/kg 2/2 wound)  Enteral (Free Water) Fluid (mL): 642  Free Water Flush Fluid (mL): 10(mls/hr)  Energy Calories Required: not meeting needs  Protein Required: not meeting needs  Fluid Required: meeting needs  % Intake of Estimated Energy Needs: 50 - 75 %  (meets 71% of EEN and 68% of EPN)  % Meal Intake: NPO    Nutrition Risk    Level of Risk/Frequency of Follow-up: (2 x wkly)     Assessment and Plan    Severe malnutrition    In acute on chronic illness    Related to (etiology):   Increased energy needs 2/2 pneumonia in presence of pulmonary dysfunction    Signs and Symptoms (as evidenced by):   1) Inability to wean from vent (pt on trache)  2) Decreased functional ability (pt quadriplegic)  3) Stage 3 sacral decub  4) Severe muscle loss noted in shoulders,  clavicle, hands and upper arm.  5) Moderate-severe fat loss noted in upper arm and around eyes  6) Weight loss of 8.77% x 4 months (9/12/18, 55 kg and 5/17/18, 60.3 kg)    Interventions/Recommendations (treatment strategy):  Recommend increased Isosource 1.5 flow rate to 45 mls/hr to meet 100% of EEN/EPN.     Nutrition Diagnosis Status:   New               Monitor and Evaluation    Food and Nutrient Intake: energy intake  Food and Nutrient Adminstration: diet order  Anthropometric Measurements: weight, weight change  Biochemical Data, Medical Tests and Procedures: inflammatory profile, glucose/endocrine profile  Nutrition-Focused Physical Findings: overall appearance, skin     Nutrition Follow-Up  yes

## 2018-09-13 NOTE — PT/OT/SLP EVAL
"Physical Therapy Evaluation    Patient Name:  Reta Styles   MRN:  [id number]    Recommendations:     Discharge Recommendations:  home health PT   Discharge Equipment Recommendations: none   Barriers to discharge: None    Assessment:     Reta Styles is a 58 y.o. female admitted with a medical diagnosis of VAP (ventilator-associated pneumonia).  She presents with the following impairments/functional limitations:  weakness, impaired functional mobilty, decreased upper extremity function, decreased safety awareness, impaired skin, impaired endurance, impaired self care skills, decreased lower extremity function, decreased ROM. Pt is alert and follows commands. Pt mouthing words. Pt tolerated supine AAROM of upper and lower extremities. Pt will benefit from continued therapies.     Rehab Prognosis:  fair; patient would benefit from acute skilled PT services to address these deficits and reach maximum level of function.      Recent Surgery: * No surgery found *      Plan:     During this hospitalization, patient to be seen 6x/week to address the above listed problems via therapeutic activities, therapeutic exercises  · Plan of Care Expires:  10/12/18   Plan of Care Reviewed with: patient    Subjective     Communicated with nurse Crenshaw prior to session.  Patient found supine upon PT entry to room, agreeable to evaluation. Pt in good spirits. Pt indicated that moving "feels good". Pt able to express needs and answering questions by mouthing words    Chief Complaint: none stated  Patient comments/goals: go home  Pain/Comfort:  · Pain Rating 1: 0/10    Patients cultural, spiritual, Gnosticist conflicts given the current situation:      Living Environment:  Pt lives at home with mother and daughter  Prior to admission, patients level of function was bed bound.  Patient has the following equipment: hospital bed, respiratory supplies, ventilator.  DME owned (not currently used): none.  Upon discharge, patient will have " assistance from mother and daughter.    Objective:     Patient found with: PEG Tube, pressure relief boots, peripheral IV, tracheostomy, ventilator, telemetry     General Precautions: Standard, fall   Orthopedic Precautions:N/A   Braces: N/A     Exams:  · RUE ROM: WFL  · RUE Strength: 2/5  · LUE ROM: WFL  · LUE Strength: 2/5  · RLE ROM: WFL  · RLE Strength: Deficits: extensors 2+/5, flexors 1/5, plantar/dorsiflexion 0/5  · LLE ROM: WFL  · LLE Strength: Deficits: extensors 2+/5, flexors 1/5, plantar/dorsiflexion 0/5    Functional Mobility:  · Bed Mobility:     · Pt is bed bound max to total assist for positioning in bed  · Pt tolerated AAROME of the extremities     AM-PAC 6 CLICK MOBILITY  Total Score:6       Therapeutic Activities and Exercises:   pt tolerated AAROME of LE and UE  Repositioning in bed with heel protector boots reapplied    Patient left supine with all lines intact, call button in reach and nurse Flower notified.    GOALS:   Multidisciplinary Problems     Physical Therapy Goals        Problem: Physical Therapy Goal    Goal Priority Disciplines Outcome Goal Variances Interventions   Physical Therapy Goal     PT, PT/OT Ongoing (interventions implemented as appropriate)     Description:  Goals to be met by: 10-     Patient will increase functional independence with mobility by performin. Tolerate exercise x20 reps with assistance as needed  2. Sitting at edge of bed x20 minutes with maximum Assistance  3. Prevent lower extremity contractures                        History:     Past Medical History:   Diagnosis Date    Arthritis     CIDP (chronic inflammatory demyelinating polyneuropathy)     Hypertension     Hypoparathyroidism     Dr. Rahman    Thyroid disease        Past Surgical History:   Procedure Laterality Date    ESOPHAGOGASTRODUODENOSCOPY (EGD) N/A 2016    [facility]    parathyroid removal      PARATHYROIDECTOMY Bilateral 2017     PEG W/TRACHEOSTOMY PLACEMENT      TUBAL LIGATION         Clinical Decision Making:     History  Co-morbidities and personal factors that may impact the plan of care Examination  Body Structures and Functions, activity limitations and participation restrictions that may impact the plan of care Clinical Presentation   Decision Making/ Complexity Score   Co-morbidities:   [] Time since onset of injury / illness / exacerbation  [] Status of current condition  []Patient's cognitive status and safety concerns    [] Multiple Medical Problems (see med hx)  Personal Factors:   [] Patient's age  [] Prior Level of function   [] Patient's home situation (environment and family support)  [] Patient's level of motivation  [] Expected progression of patient      HISTORY:(criteria)    [] 87485 - no personal factors/history    [] 32777 - has 1-2 personal factor/comorbidity     [] 53700 - has >3 personal factor/comorbidity     Body Regions:  [] Objective examination findings  [] Head     []  Neck  [] Trunk   [] Upper Extremity  [] Lower Extremity    Body Systems:  [] For communication ability, affect, cognition, language, and learning style: the assessment of the ability to make needs known, consciousness, orientation (person, place, and time), expected emotional /behavioral responses, and learning preferences (eg, learning barriers, education  needs)  [] For the neuromuscular system: a general assessment of gross coordinated movement (eg, balance, gait, locomotion, transfers, and transitions) and motor function  (motor control and motor learning)  [] For the musculoskeletal system: the assessment of gross symmetry, gross range of motion, gross strength, height, and weight  [] For the integumentary system: the assessment of pliability(texture), presence of scar formation, skin color, and skin integrity  [] For cardiovascular/pulmonary system: the assessment of heart rate, respiratory rate, blood pressure, and edema     Activity  limitations:    [] Patient's cognitive status and saf ety concerns          [] Status of current condition      [] Weight bearing restriction  [] Cardiopulmunary Restriction    Participation Restrictions:   [] Goals and goal agreement with the patient     [] Rehab potential (prognosis) and probable outcome      Examination of Body System: (criteria)    [] 03626 - addressing 1-2 elements    [] 04893 - addressing a total of 3 or more elements     [] 76971 -  Addressing a total of 4 or more elements         Clinical Presentation: (criteria)  Choose one     On examination of body system using standardized tests and measures patient presents with (CHOOSE ONE) elements from any of the following: body structures and functions, activity limitations, and/or participation restrictions.  Leading to a clinical presentation that is considered (CHOOSE ONE)                              Clinical Decision Making  (Eval Complexity):  Choose One     Time Tracking:     PT Received On: 09/13/18  PT Start Time: 1140     PT Stop Time: 1208  PT Total Time (min): 28 min     Billable Minutes: Evaluation 8 and Therapeutic Activity 20      Yvonne Cardenas, PT  09/13/2018

## 2018-09-13 NOTE — PLAN OF CARE
Phone contact with pt's daughter Hardik Styles, [phone].  Pt is currently living at her mother's home at [address] [place] [address] and pt's mother and daughter are caring for pt.  Pt's spouse is living at their home on  [address]  Pt is on a home vent, 2.5 to 3 liters of home oxygen, tube feeding, nebulizer, gerichair and resp supplies.  Pt's home health is with [facility].  Daughter verbalized some dissatisfaction with them and verbalized interest in changing to [facility].  I spoke to Jaleesa at [facility], [phone] who denied taking pt's on home vents.  Updated the daughter who decided to stay with [facility] at this time.  Pt's PCP is Dr. NEENA Shay, neurologist is Dr. Harkins at [facility] and insurance is [employer]/.  Pt transports by ambulance.  Pt's discharge disposition is home with [facility].  Obtained verbal consent for the disclosure form.       09/13/18 1030   Discharge Assessment   Assessment Type Discharge Planning Assessment   Confirmed/corrected address and phone number on facesheet? Yes  (Address is pt's home.  Pt is temporarily living at [address] [place] [address].)   Assessment information obtained from? Other  (pt's daughter Hardik by phone)   Prior to hospitilization cognitive status: Coma/Sedated/Intubated   Prior to hospitalization functional status: Completely Dependent   Current cognitive status: Coma/Sedated/Intubated   Current Functional Status: Completely Dependent   Lives With child(pola), adult;parent(s)  (Pt is living with her mother and daughter Hardik at pt's mother's home. )   Able to Return to Prior Arrangements yes   Is patient able to care for self after discharge? No   Who are your caregiver(s) and their phone number(s)? (daughter Hardik Styles, [phone])   Patient's perception of discharge disposition home health   Readmission Within The Last 30 Days no previous admission in last 30 days   Patient currently being followed  by outpatient case management? No   Patient currently receives any other outside agency services? Yes   Name and contact number of agency or person providing outside services ([facility])   Is it the patient/care giver preference to resume care with the current outside agency? Yes   Equipment Currently Used at Home nebulizer;respiratory supplies;ventilator;oxygen;other (see comments);hospital bed  (sarina chair)   Do you have any problems affording any of your prescribed medications? No  (pharmacy is [facility] on [address])   Is the patient taking medications as prescribed? yes   Does the patient have transportation home? Yes   Transportation Available ambulance   Does the patient receive services at the [facility]? No   Discharge Plan A Home Health;Home with family   Patient/Family In Agreement With Plan yes

## 2018-09-13 NOTE — PLAN OF CARE
Problem: Patient Care Overview  Goal: Plan of Care Review  Outcome: Ongoing (interventions implemented as appropriate)  Pt slept most of shift. VSS. Pt denied pain. Pt running low grade temp Tmax 100.5. Safety maintained.

## 2018-09-13 NOTE — PLAN OF CARE
Problem: Physical Therapy Goal  Goal: Physical Therapy Goal  Goals to be met by: 10-     Patient will increase functional independence with mobility by performin. Supine to sit with Moderate Assistance  2. Sit to supine with Moderate Assistance  3. Rolling to Left with Moderate Assistance.  4. Bed to chair transfer with Moderate Assistance using Rolling Walker  5. Sitting at edge of bed x5 minutes with Moderate Assistance      Outcome: Ongoing (interventions implemented as appropriate)  PT eval and treat completed, pt alert and responsive to commands, tolerated supine AAROM of upper and lower extremities.

## 2018-09-13 NOTE — PLAN OF CARE
Problem: Patient Care Overview  Goal: Plan of Care Review  Outcome: Ongoing (interventions implemented as appropriate)  Plan of care reviewed with patient throughout the day. Patient oriented x4. Family at bedside. Wound care assessment complete. See notes. PT and OT working with patient. VSS. Will continue to monitor patient closely.

## 2018-09-13 NOTE — CONSULTS
Reta Styles [id number] is a 58 y.o. female who had been consulted for vancomycin dosing.    Vancomycin has been discontinued.  Pharmacy consult for vancomycin dosing in no longer required.      Thank you for allowing us to participate in this patient's care.     Vignesh Garcia

## 2018-09-13 NOTE — ASSESSMENT & PLAN NOTE
In acute on chronic illness    Related to (etiology):   Increased energy needs 2/2 pneumonia in presence of pulmonary dysfunction    Signs and Symptoms (as evidenced by):   1) Inability to wean from vent (pt on trache)  2) Decreased functional ability (pt quadriplegic)  3) Stage 3 sacral decub  4) Severe muscle loss noted in shoulders, clavicle, hands and upper arm.  5) Moderate-severe fat loss noted in upper arm and around eyes  6) Weight loss of 8.77% x 4 months (9/12/18, 55 kg and 5/17/18, 60.3 kg)    Interventions/Recommendations (treatment strategy):  Recommend increased Isosource 1.5 flow rate to 45 mls/hr to meet 100% of EEN/EPN.     Nutrition Diagnosis Status:   New

## 2018-09-13 NOTE — PLAN OF CARE
Sacral area incontinent associated dermatitis with skin integrity impairment.  Discussed plan of care with caretakers at bedside.  Recommend leave diaper off and use Critic Aid Clear ANTIFUNGAL cream.

## 2018-09-13 NOTE — PLAN OF CARE
09/13/18 0711   Patient Assessment/Suction   Level of Consciousness (AVPU) alert   Respiratory Effort Unlabored   Expansion/Accessory Muscles/Retractions no retractions;no use of accessory muscles   All Lung Fields Breath Sounds coarse   Rhythm/Pattern, Respiratory ventilator assisted   Cough Type good   $ Suction Charges Inline Suction Procedure Stat Charge   Sputum Amount small   Sputum Color white   Sputum Consistency thick   PRE-TX-O2-ETCO2   O2 Device (Oxygen Therapy) ventilator   $ Is the patient on Low Flow Oxygen? Yes   Oxygen Concentration (%) 40   SpO2 99 %   Pulse Oximetry Type Continuous   Pulse (!) 54   Resp (!) 21   Aerosol Therapy   $ Aerosol Therapy Charges Aerosol Treatment   Respiratory Treatment Status given   SVN/Inhaler Treatment Route in-line   Position During Treatment HOB at 45 degrees   Patient Tolerance good   Post-Treatment   Post-treatment Heart Rate (beats/min) 55   Post-treatment Resp Rate (breaths/min) 22   All Fields Breath Sounds aeration increased       Surgical Airway Shiley Fenestrated   No Placement Date or Time found.   Present Prior to Hospital Arrival?: Yes  Type: Tracheostomy  Brand: Shiley  Airway Device Size: 6.0  Style: Fenestrated   Cuff Pressure 30 cm H2O   Cuff Inflation? Inflated   Status Secured   Site Assessment Clean;Dry   Ties Assessment Clean;Dry;Intact   Vent Select   $ Ventilator Subsequent 1   Charged w/in last 24h YES   IHI Ventilator Associated Pneumonia Bundle   Head of Bed Elevated  HOB 30   Oral Care Mouth suctioned   Preset Conventional Ventilator Settings   Vent Type    Ventilation Type VC   Vent Mode A/C   Humidity HME   Set Rate 14 bmp   Vt Set 500 mL   PEEP/CPAP 5 cmH20   Pressure Support 0 cmH20   Waveform RAMP   Peak Flow 65 L/min   Set Inspiratory Pressure 0 cmH20   Insp Time 0 Sec(s)   Plateau Set/Insp. Hold (sec) 0   Insp Rise Time  0 %   Trigger Sensitivity Flow/I-Trigger 3 L/min   P High 0 cm H2O   P Low 0 cm H2O   T High 0 sec   T Low 0  sec   Patient Ventilator Parameters   Resp Rate Total 19 br/min   Peak Airway Pressure 30 cmH2O   Mean Airway Pressure 12 cmH20   Plateau Pressure 0 cmH20   Exhaled Vt 497 mL   Total Ve 9.93 mL   Spont Ve 0 L   I:E Ratio Measured 1:2.30   Conventional Ventilator Alarms   Ve High Alarm 27 L/min   Resp Rate High Alarm 45 br/min   Press High Alarm 46 cmH2O   Apnea Rate 10   Apnea Volume (mL) 620 mL   Apnea Oxygen Concentration  100   Apnea Flow Rate (L/min) 74   T Apnea 20 sec(s)   Ready to Wean/Extubation Screen   FIO2<=50 (chart decimal) 0.4   MV<16L (chart vol.) 9.93   PEEP <=8 (chart #) 5   Ready to Wean Parameters   F/VT Ratio<105 (RSBI) (!) 42.25

## 2018-09-13 NOTE — PLAN OF CARE
Problem: Physical Therapy Goal  Goal: Physical Therapy Goal  Goals to be met by: 10-     Patient will increase functional independence with mobility by performin. Tolerate exercise x20 reps with assistance as needed  2. Prevent LE contractures  3. Tolerate EOB sitting x20 minutes with Maximum assistance  Outcome: Ongoing (interventions implemented as appropriate)  PT eval and treat completed, pt tolerated supine AAROME.

## 2018-09-13 NOTE — PLAN OF CARE
Attempted to complete pt's assessment.  Pt is on a vent; no family in the room.  CM will follow up with family later.      09/13/18 0915   Discharge Assessment   Assessment Type Discharge Planning Assessment

## 2018-09-14 VITALS
WEIGHT: 116.88 LBS | RESPIRATION RATE: 17 BRPM | HEART RATE: 66 BPM | TEMPERATURE: 99 F | DIASTOLIC BLOOD PRESSURE: 76 MMHG | BODY MASS INDEX: 17.31 KG/M2 | SYSTOLIC BLOOD PRESSURE: 119 MMHG | OXYGEN SATURATION: 98 % | HEIGHT: 69 IN

## 2018-09-14 PROBLEM — J96.12 CHRONIC RESPIRATORY FAILURE WITH HYPERCAPNIA: Status: ACTIVE | Noted: 2018-09-12

## 2018-09-14 PROBLEM — J98.11 ATELECTASIS: Status: ACTIVE | Noted: 2018-09-14

## 2018-09-14 LAB
ANION GAP SERPL CALC-SCNC: 8 MMOL/L
BASOPHILS # BLD AUTO: 0.1 K/UL
BASOPHILS NFR BLD: 0.4 %
BUN SERPL-MCNC: 11 MG/DL
CALCIUM SERPL-MCNC: 9.1 MG/DL
CHLORIDE SERPL-SCNC: 110 MMOL/L
CO2 SERPL-SCNC: 22 MMOL/L
CREAT SERPL-MCNC: 0.4 MG/DL
DIFFERENTIAL METHOD: ABNORMAL
EOSINOPHIL # BLD AUTO: 0.1 K/UL
EOSINOPHIL NFR BLD: 0.7 %
ERYTHROCYTE [DISTWIDTH] IN BLOOD BY AUTOMATED COUNT: 17.5 %
EST. GFR  (AFRICAN AMERICAN): >60 ML/MIN/1.73 M^2
EST. GFR  (NON AFRICAN AMERICAN): >60 ML/MIN/1.73 M^2
GLUCOSE SERPL-MCNC: 96 MG/DL
HCT VFR BLD AUTO: 28.6 %
HGB BLD-MCNC: 9.5 G/DL
LYMPHOCYTES # BLD AUTO: 2.4 K/UL
LYMPHOCYTES NFR BLD: 16.9 %
MAGNESIUM SERPL-MCNC: 2 MG/DL
MCH RBC QN AUTO: 30.4 PG
MCHC RBC AUTO-ENTMCNC: 33.4 G/DL
MCV RBC AUTO: 91 FL
MONOCYTES # BLD AUTO: 1.3 K/UL
MONOCYTES NFR BLD: 9.4 %
NEUTROPHILS # BLD AUTO: 10.2 K/UL
NEUTROPHILS NFR BLD: 72.6 %
PLATELET # BLD AUTO: 422 K/UL
PMV BLD AUTO: 7.4 FL
POTASSIUM SERPL-SCNC: 4.7 MMOL/L
PROCALCITONIN SERPL IA-MCNC: 0.1 NG/ML
RBC # BLD AUTO: 3.14 M/UL
SODIUM SERPL-SCNC: 140 MMOL/L
WBC # BLD AUTO: 14 K/UL

## 2018-09-14 PROCEDURE — 25000003 PHARM REV CODE 250: Performed by: NURSE PRACTITIONER

## 2018-09-14 PROCEDURE — 25000242 PHARM REV CODE 250 ALT 637 W/ HCPCS: Performed by: HOSPITALIST

## 2018-09-14 PROCEDURE — 94640 AIRWAY INHALATION TREATMENT: CPT

## 2018-09-14 PROCEDURE — 63600175 PHARM REV CODE 636 W HCPCS: Performed by: NURSE PRACTITIONER

## 2018-09-14 PROCEDURE — 99900026 HC AIRWAY MAINTENANCE (STAT)

## 2018-09-14 PROCEDURE — 99223 1ST HOSP IP/OBS HIGH 75: CPT | Mod: ,,, | Performed by: INTERNAL MEDICINE

## 2018-09-14 PROCEDURE — 80048 BASIC METABOLIC PNL TOTAL CA: CPT

## 2018-09-14 PROCEDURE — 92610 EVALUATE SWALLOWING FUNCTION: CPT

## 2018-09-14 PROCEDURE — 63600175 PHARM REV CODE 636 W HCPCS: Performed by: HOSPITALIST

## 2018-09-14 PROCEDURE — 0B21XFZ CHANGE TRACHEOSTOMY DEVICE IN TRACHEA, EXTERNAL APPROACH: ICD-10-PCS | Performed by: INTERNAL MEDICINE

## 2018-09-14 PROCEDURE — 94003 VENT MGMT INPAT SUBQ DAY: CPT

## 2018-09-14 PROCEDURE — 99900035 HC TECH TIME PER 15 MIN (STAT)

## 2018-09-14 PROCEDURE — 25000003 PHARM REV CODE 250: Performed by: HOSPITALIST

## 2018-09-14 PROCEDURE — 36415 COLL VENOUS BLD VENIPUNCTURE: CPT

## 2018-09-14 PROCEDURE — 27000221 HC OXYGEN, UP TO 24 HOURS

## 2018-09-14 PROCEDURE — 85025 COMPLETE CBC W/AUTO DIFF WBC: CPT

## 2018-09-14 PROCEDURE — 83735 ASSAY OF MAGNESIUM: CPT

## 2018-09-14 PROCEDURE — 31502 CHANGE OF WINDPIPE AIRWAY: CPT | Mod: ,,, | Performed by: INTERNAL MEDICINE

## 2018-09-14 PROCEDURE — 92611 MOTION FLUOROSCOPY/SWALLOW: CPT

## 2018-09-14 PROCEDURE — 84145 PROCALCITONIN (PCT): CPT

## 2018-09-14 RX ORDER — AZITHROMYCIN 500 MG/1
500 TABLET, FILM COATED ORAL DAILY
Qty: 3 TABLET | Refills: 0 | Status: SHIPPED | OUTPATIENT
Start: 2018-09-14 | End: 2018-09-17

## 2018-09-14 RX ORDER — CEFDINIR 250 MG/5ML
250 POWDER, FOR SUSPENSION ORAL EVERY 12 HOURS
Qty: 50 ML | Refills: 0 | Status: SHIPPED | OUTPATIENT
Start: 2018-09-14 | End: 2018-09-19

## 2018-09-14 RX ADMIN — AMLODIPINE BESYLATE 10 MG: 5 TABLET ORAL at 10:09

## 2018-09-14 RX ADMIN — LEVOTHYROXINE SODIUM 175 MCG: 150 TABLET ORAL at 06:09

## 2018-09-14 RX ADMIN — ALBUTEROL SULFATE 2.5 MG: 2.5 SOLUTION RESPIRATORY (INHALATION) at 07:09

## 2018-09-14 RX ADMIN — METOPROLOL TARTRATE 25 MG: 25 TABLET ORAL at 10:09

## 2018-09-14 RX ADMIN — PREDNISONE 40 MG: 20 TABLET ORAL at 10:09

## 2018-09-14 RX ADMIN — CITALOPRAM HYDROBROMIDE 20 MG: 10 TABLET ORAL at 10:09

## 2018-09-14 RX ADMIN — FAMOTIDINE 20 MG: 20 TABLET ORAL at 10:09

## 2018-09-14 RX ADMIN — ALBUTEROL SULFATE 2.5 MG: 2.5 SOLUTION RESPIRATORY (INHALATION) at 02:09

## 2018-09-14 RX ADMIN — CALCITRIOL 0.25 MCG: 0.25 CAPSULE, LIQUID FILLED ORAL at 10:09

## 2018-09-14 RX ADMIN — CEFTRIAXONE 1 G: 1 INJECTION, SOLUTION INTRAVENOUS at 10:09

## 2018-09-14 RX ADMIN — MICONAZOLE NITRATE: 20 OINTMENT TOPICAL at 10:09

## 2018-09-14 NOTE — DISCHARGE INSTRUCTIONS
Thank you for choosing [facility] for your medical care. The primary doctor who is taking care of you at the time of your discharge is Sahil Wick MD.     You were admitted to the hospital with VAP (ventilator-associated pneumonia).     Please note your discharge instructions, including diet/activity restrictions, follow-up appointments, and medication changes.  If you have any questions about your medical issues, prescriptions, or any other questions, please feel free to contact the [facility] Medicine Dept at [phone] and we will help.    If you are previously with Home health, outpatient PT/OT or under a therapy program, you are cleared to return to those programs.    Please direct all long term medication refills and follow up to your primary care provider, Anish Shay MD. Thank you again for letting us take care of your health care needs.

## 2018-09-14 NOTE — PLAN OF CARE
Attempted to schedule pt's hospital follow up.  Dr. Shay's office was closed.  Updated the AVS and pt's nurse.     09/14/18 5809   Discharge Reassessment   Assessment Type Discharge Planning Reassessment

## 2018-09-14 NOTE — PROCEDURES
9/14/2018    Pt was in icu with no change trach last month- discussed trach change and pt agrees.  Daughter at bedside.  6 fenestrated shiley changed out usual fashion with no problem.  ebl 5 cc.  No complication.

## 2018-09-14 NOTE — PLAN OF CARE
Problem: Patient Care Overview  Goal: Individualization & Mutuality  Outcome: Ongoing (interventions implemented as appropriate)  Patient updated and involved in plan of care. Questions answered and reassurance given. Continue plan of care.

## 2018-09-14 NOTE — PLAN OF CARE
Problem: Patient Care Overview  Goal: Plan of Care Review  Outcome: Ongoing (interventions implemented as appropriate)  Patient free of falls and injuries this shift. Remains trach/vent. Communicates well being non-verbal. Able to follow simple commands with gross motor to hands noted bilateral. Voids per pads, antifungal ointment applied. Skin breakdown not as red or angry looking. She is unable to tell if she has voided or not. Sinus on monitor.

## 2018-09-14 NOTE — ASSESSMENT & PLAN NOTE
Patient has hypokalemia which is currently uncontrolled. Last electrolytes reviewed-   Recent Labs   Lab  09/12/18   1222  09/13/18   0627   K  3.2*  3.3*   . Will replace potassium and monitor electrolytes closely.

## 2018-09-14 NOTE — PLAN OF CARE
09/13/18 1933   Patient Assessment/Suction   Level of Consciousness (AVPU) alert   Respiratory Effort Normal;Unlabored   Expansion/Accessory Muscles/Retractions expansion symmetric   All Lung Fields Breath Sounds coarse   Rhythm/Pattern, Respiratory ventilator assisted   Cough Frequency with stimulation   Cough Type good   Suction Method in-line suction catheter (closed)   $ Suction Charges Inline Suction Procedure Stat Charge   Sputum Amount moderate   Sputum Color white   Sputum Consistency thin   PRE-TX-O2-ETCO2   O2 Device (Oxygen Therapy) ventilator   Oxygen Concentration (%) 21   SpO2 96 %   Pulse Oximetry Type Continuous   Pulse 64   Resp (!) 24   Aerosol Therapy   $ Aerosol Therapy Charges Aerosol Treatment   Respiratory Treatment Status given   SVN/Inhaler Treatment Route in-line   Position During Treatment HOB at 45 degrees   Patient Tolerance good   Post-Treatment   Post-treatment Heart Rate (beats/min) 64   Post-treatment Resp Rate (breaths/min) 20   All Fields Breath Sounds aeration increased       Surgical Airway Shiley Fenestrated   No Placement Date or Time found.   Present Prior to Hospital Arrival?: Yes  Type: Tracheostomy  Brand: Shiley  Airway Device Size: 6.0  Style: Fenestrated   Cuff Pressure 28 cm H2O   Cuff Inflation? Inflated   Status Secured   Site Assessment Clean   Ties Assessment Clean   Vent Select   Conventional Vent Y   Charged w/in last 24h YES   Preset Conventional Ventilator Settings   Vent Type    Ventilation Type VC   Vent Mode A/C   Humidity HME   Set Rate 14 bmp   Vt Set 500 mL   PEEP/CPAP 5 cmH20   Pressure Support 0 cmH20   Waveform RAMP   Peak Flow 65 L/min   Set Inspiratory Pressure 0 cmH20   Insp Time 0 Sec(s)   Plateau Set/Insp. Hold (sec) 0   Insp Rise Time  0 %   Trigger Sensitivity Flow/I-Trigger 2 L/min   P High 0 cm H2O   P Low 0 cm H2O   T High 0 sec   T Low 0 sec   Patient Ventilator Parameters   Resp Rate Total 17 br/min   Peak Airway Pressure 27 cmH2O    Mean Airway Pressure 11 cmH20   Plateau Pressure 0 cmH20   Exhaled Vt 512 mL   Total Ve 9 mL   Spont Ve 0 L   I:E Ratio Measured 1:1.70   Conventional Ventilator Alarms   Ve High Alarm 27 L/min   Resp Rate High Alarm 45 br/min   Press High Alarm 100 cmH2O   Apnea Rate 10   Apnea Volume (mL) 620 mL   Apnea Oxygen Concentration  100   Apnea Flow Rate (L/min) 74   T Apnea 20 sec(s)   Ready to Wean/Extubation Screen   FIO2<=50 (chart decimal) 0.21   MV<16L (chart vol.) 9   PEEP <=8 (chart #) 5   Ready to Wean Parameters   F/VT Ratio<105 (RSBI) (!) 46.88

## 2018-09-14 NOTE — PLAN OF CARE
09/14/18 1549   Final Note   Assessment Type Final Discharge Note   Discharge Disposition Home-Health  (Aditya)   What phone number can be called within the next 1-3 days to see how you are doing after discharge? ([phone])   Hospital Follow Up  Appt(s) scheduled? No  (MD office closed.  Pt's family will call for the appointment. )

## 2018-09-14 NOTE — ASSESSMENT & PLAN NOTE
Received IVIG monthly. [place] [facility] [place] Continue chronic prednisone.  Last dose 8/20/2018

## 2018-09-14 NOTE — PROGRESS NOTES
Juliann, from Dr. Vasquez's office called to inform that he will not come to change patient trach. Dr. Wick informed.

## 2018-09-14 NOTE — PROCEDURES
Modified Barium Swallow    Patient Name:  Reta Styles   MRN:  [id number]      Recommendations:     Recommendations:                General Recommendations:  Follow-up not indicated and Pt discharging home today    Diet recommendations:  NPO, NPO    General Precautions: Standard, fall, NPO      Referral     Reason for Referral  Patient was referred for a Modified Barium Swallow Study to assess the efficiency of his/her swallow function, rule out aspiration and make recommendations regarding safe dietary consistencies, effective compensatory strategies, and safe eating environment.     Diagnosis: VAP (ventilator-associated pneumonia)       History:     Past Medical History:   Diagnosis Date    Arthritis     CIDP (chronic inflammatory demyelinating polyneuropathy)     Hypertension     Hypoparathyroidism     Dr. Rahman    Thyroid disease        Objective:     Current Respiratory Status: 09/14/18    Alert: yes    Cooperative: yes    Follows Directions: yes    Visualization  · Patient was seen in the lateral view  · Tracheostomy tube visualized in place/ One Way Speaking Valve absent    Oral Peripheral Examination  · Oral Musculature: WFL  · Dentition: present and adequate  · Secretion Management: adequate  · Mucosal Quality: adequate  · Mandibular Strength and Mobility: WFL  · Oral Labial Strength and Mobility: WFL  · Lingual Strength and Mobility: WFL  · Buccal Strength and Mobility: WFL  · Volitional Cough: weak and ineffective  · Volitional Swallow: able to palpate laryngeal movement  · Voice Prior to PO Intake: strained vocal quality; mostly aphonic       09/14/18 1629   Speech Time Calculation   Speech Start Time 1501   Speech Stop Time 1527   Speech Total (min) 26 min   General Information   SLP Treatment Date 09/14/18   Referring Physician Delano   General Observations Alert and cooperative. Trach. RT present to bag patient. ICU nursing present   Pertinent History of Current Problem CIDP with respiratory  failure, trach/vent DEP   Current Respiratory Status trach with cuff deflated;trach with vent  (RT ambu bag)   General Precautions fall;NPO   Current Diet   Current Diet Textures NPO;PEG   Current Liquid Consistencies NPO       MBS Eval: Thin Liquid Trial   Mode of Presentation, Thin Liquid spoon;fed by clinician   Oral Prep/Phase, Thin Liquid WFL   Pharyngeal Phase, Thin Liquid reduction in laryngeal elevation;reduced hyolaryngeal excursion;no epiglottic inversion;pyriform sinuses stasis;multiple spontaneous swallows   Rosenbeck's 8-Point Penetration-Aspiration Scale, Thin Liquids (5) Material enters the airway, contacts the vocal fold, and is not ejected from the airway.;(8) Material enters the airway, passes below the vocal folds, and no effort is made to eject.   Penetration/Aspiration, Thin Liquid Deep penetration with eventual silent aspiration   Esophageal Phase, Thin decreased UES opening       MBS Eval: Nectar Thick Liquid Trial   Mode of Presentation, Nectar spoon;fed by clinician;cup;straw   Oral Prep/Phase, [place] WFL   Pharyngeal Phase, Nectar decreased pharyngeal wall contraction;no epiglottic inversion;pyriform sinuses stasis;reduction in laryngeal elevation;reduced hyolaryngeal excursion;multiple spontaneous swallows   Rosenbeck's 8-Point Penetration-Aspiration Scale, Nectar Thick Liquids (8) Material enters the airway, passes below the vocal folds, and no effort is made to eject.       MBS Eval: Pureed Trial   Mode of Presentation, Puree spoon;fed by clinician   Oral Prep/Phase, Puree WFL   Pharyngeal Phase, Puree impaired;decreased base of tongue retraction;no epiglottic inversion;pyriform sinuses stasis;reduction in laryngeal elevation;delayed pharyngeal swallow;reduced hyolaryngeal excursion;vallecular stasis   Rosenbeck's 8-Point Penetration-Aspiration Scale, Pureed (2) Material enters the airway, remains above the vocal folds, and is ejected from the airway.   Recommendations   Solid Diet Level  NPO   Liquid Diet Level NPO   SLP Follow-up   SLP Follow-up? No   Treatment/Billable Minutes   Motion Fluoro Swallow, Cine/Vid 26   Total Time 26       Cervical Esophageal Phase  · Decreased UES opening    Assessment:     Impressions  ·   Patient demonstrates severe pharyngeal dysphagia characterized by silent aspiration of thin and nectar thick liquids. Limited laryngeal elevation and hyolaryngeal excursion resulting in poor airway protection and decrease UES opening. Consistent pyriform sinus residue and mild pharyngeal wall residue noted resulting in repeated trace penetration/aspiration. Cued cough/throat clear was largely ineffective. Multiple spontaneous swallows noted with each trial but ineffective in eliminating residue. Pt judged to be at high risk for aspiration. REC pt remain NPO and continue PEG feedings    Prognosis: Guarded    Barriers:  · Respiratory compromise    Education  Results were discussed with patient. She was receptive to all information provided.     Plan:   · Patient to be seen:      · Plan of Care expires:     · Plan of Care reviewed with:  patient        Discharge recommendations:      Barriers to Discharge:  None    Time Tracking:   SLP Treatment Date:   09/14/18  Speech Start Time:  1501  Speech Stop Time:  1527     Speech Total Time (min):  26 min    Danyelle Carolina CCC-SLP  09/14/2018

## 2018-09-14 NOTE — PROGRESS NOTES
"[facility] Medicine  Progress Note    Patient Name: Reta Styles  MRN: [id number]  Patient Class: IP- Inpatient   Admission Date: 9/12/2018  Length of Stay: 1 days  Attending Physician: Sahil Wick MD  Primary Care Provider: Anish Shay MD        Subjective:     Principal Problem:VAP (ventilator-associated pneumonia)    HPI:  Reta Styles is a 58 year old female with a past medical history of CIPD, quadraplegia with trach and peg, HTN, and thyroid disease that presents to the ED with complaints of chest pain and a fever. Patient has a home ventilator due to her CIPD and is currently on her home settings and denies any shortness of breath. Pt's sister states the fever began a couple days ago, with a max temp of 100.4, which was treated with Tylenol and temporarily improved. Pt states the chest pain began this morning and describes it as a "burning" pain in the epigastric region that radiates to the mid sternal area. She denies any nausea, vomiting, or diaphoresis.      WBC count elevated and chest x-ray shows bilateral infiltrates that are consistent with pneumonia.         Hospital Course:  No notes on file    Interval History: Patient seen and examined.  Overnight, no acute events.  Patient did have small temperature of 100.5° at midnight but otherwise symptomatically has been remarkably stable.  States she feels much better this morning.  No cough or other symptoms of respiratory disease. Plan of care discussed with the patient and the nurse at the bedside in detail.    Review of Systems   Unable to perform ROS: Patient nonverbal     Objective:     Vital Signs (Most Recent):  Temp: 99 °F (37.2 °C) (09/13/18 1600)  Pulse: 64 (09/13/18 1933)  Resp: (!) 24 (09/13/18 1933)  BP: (!) 157/76 (09/13/18 1800)  SpO2: 96 % (09/13/18 1933) Vital Signs (24h Range):  Temp:  [99 °F (37.2 °C)-100.5 °F (38.1 °C)] 99 °F (37.2 °C)  Pulse:  [53-84] 64  Resp:  [14-42] 24  SpO2:  [91 %-100 %] " 96 %  BP: ()/(55-83) 157/76     Weight: 55 kg (121 lb 4.1 oz)  Body mass index is 17.91 kg/m².    Intake/Output Summary (Last 24 hours) at 9/13/2018 2149  Last data filed at 9/13/2018 1510  Gross per 24 hour   Intake 1925 ml   Output --   Net 1925 ml      Physical Exam   Constitutional: She is oriented to person, place, and time. She appears well-developed.   Thin female   HENT:   Head: Normocephalic and atraumatic.   Right Ear: External ear normal.   Left Ear: External ear normal.   Mouth/Throat: Oropharynx is clear and moist.   Eyes: Conjunctivae and EOM are normal. Pupils are equal, round, and reactive to light.   Neck: Normal range of motion. Neck supple.   Trach in place. No erythema noted to site.   Cardiovascular: Normal rate, regular rhythm, normal heart sounds and intact distal pulses.   Pulmonary/Chest: Effort normal. No respiratory distress. She has no wheezes.   Trach to vent.  Rhonchi at base. No wheezes noted   Abdominal: Soft. Bowel sounds are normal.   Peg intact to upper abdomen. No erythema to site.   + mild epigastric tenderness with palpation   Musculoskeletal: She exhibits no tenderness.   Quadriplegia. non tender to joints   Neurological: She is alert and oriented to person, place, and time. She displays abnormal reflex. A sensory deficit is present. Coordination abnormal.   Answers questions appropriately, mouths words.  Diffuse muscle weakness to all groups 2/5.    Skin: Skin is warm and dry. No erythema.   Sacral pressure ulcer. Stage 3. See nursing notes  Heel protectors in place.    Psychiatric: She has a normal mood and affect. Her behavior is normal. Judgment normal.   Nursing note and vitals reviewed.      Significant Labs: All pertinent labs within the past 24 hours have been reviewed.    Significant Imaging: I have reviewed all pertinent imaging results/findings within the past 24 hours.    Assessment/Plan:      * VAP (ventilator-associated pneumonia)    Patient without risk  factors for Pseudomonas and MRSA secondary to not exposure to medical facilities and no evidence of structural lung disease.  Will deescalate antibiotics to Rocephin and azithro and follow up with procalcitonin in the morning.  Continue to monitor respiratory status and clinical status closely.              Chronic respiratory failure with hypoxia    Vent dependent.   Continue home ventilator settings.   monitor pulse oximetry          Tracheostomy status    Trach care per nursing and RT          CIDP (chronic inflammatory demyelinating polyneuropathy)    Received IVIG monthly. Followed by neurology at [facility] [place]. Continue chronic prednisone.  Last dose 8/20/2018          Severe malnutrition    Nutrition consulted. Body mass index is 17.91 kg/m².. Encourage maximal PO intake. Diet supplementation ordered per nutrition approval. Will encourage PO and monitor closely for weight changes.            Iron deficiency anemia secondary to inadequate dietary iron intake    Patient's anemia is currently controlled. S/p 0 units of PRBCs. Etiology likely d/t BERHANE  Current CBC reviewed-   Lab Results   Component Value Date    HGB 9.0 (L) 09/13/2018    HCT 27.1 (L) 09/13/2018     Monitor serial CBC and transfuse if patient becomes hemodynamically unstable, symptomatic or H/H drops below 7/21.             Acute gastritis without hemorrhage    Contributing to epigastric/ chest pain.   Continue Pepcid  Trend troponin.            Quadriplegia    Due to CIDP          Pressure ulcer of sacral region    Consult wound care. Turn every 2 hours.             Hypokalemia    Patient has hypokalemia which is currently uncontrolled. Last electrolytes reviewed-   Recent Labs   Lab  09/12/18   1222  09/13/18   0627   K  3.2*  3.3*   . Will replace potassium and monitor electrolytes closely.             PEG (percutaneous endoscopic gastrostomy) status    Peg care per nursing  Resume home tube feedings.   Check CMP in am to evaluate for malnutrition.    Consult dietitian           Acquired hypothyroidism    Chronic. Status post radiation for hyperthyroidism.  Resume home levothyroxine           Hypertension    Chronic. Resume home antihypertensive            VTE Risk Mitigation (From admission, onward)        Ordered     enoxaparin injection 40 mg  Daily      09/12/18 1629     IP VTE HIGH RISK PATIENT  Once      09/12/18 1629          Critical care time spent on the evaluation and treatment of severe organ dysfunction, review of pertinent labs and imaging studies, discussions with consulting providers and discussions with patient/family: 38 minutes.    Sahil Wick MD  Department of Hospital Medicine   [facility]

## 2018-09-14 NOTE — ASSESSMENT & PLAN NOTE
Nutrition consulted. Body mass index is 17.91 kg/m².. Encourage maximal PO intake. Diet supplementation ordered per nutrition approval. Will encourage PO and monitor closely for weight changes.

## 2018-09-14 NOTE — PLAN OF CARE
Problem: Patient Care Overview  Goal: Plan of Care Review  Outcome: Ongoing (interventions implemented as appropriate)  Care plan reviewed.  Safety maintained.  IV antibiotics for treatment of pneumonia.   Patient on ventilator with trach. Trach changed by Dr. Petersen today.  Rotation therapy utilized for a short while, and patient asked for it to be stopped. Turned with pillows.  Nutrition is tube feeding at goal of 35 ml per hour, no residuals.  Patient had swallow study done today. See speech therapy notes.   Patient may be discharged home today.

## 2018-09-14 NOTE — NURSING
Dr Petersen at bedside with respiratory and pts daughter Hardik.  Trach is being changed, lisa #6, Hardik educated on trach.  Orders for cough assist and swallow study from Dr Petersen before discharge home.

## 2018-09-14 NOTE — SUBJECTIVE & OBJECTIVE
Interval History: Patient seen and examined.  Overnight, no acute events.  Patient did have small temperature of 100.5° at midnight but otherwise symptomatically has been remarkably stable.  States she feels much better this morning.  No cough or other symptoms of respiratory disease. Plan of care discussed with the patient and the nurse at the bedside in detail.    Review of Systems   Unable to perform ROS: Patient nonverbal     Objective:     Vital Signs (Most Recent):  Temp: 99 °F (37.2 °C) (09/13/18 1600)  Pulse: 64 (09/13/18 1933)  Resp: (!) 24 (09/13/18 1933)  BP: (!) 157/76 (09/13/18 1800)  SpO2: 96 % (09/13/18 1933) Vital Signs (24h Range):  Temp:  [99 °F (37.2 °C)-100.5 °F (38.1 °C)] 99 °F (37.2 °C)  Pulse:  [53-84] 64  Resp:  [14-42] 24  SpO2:  [91 %-100 %] 96 %  BP: ()/(55-83) 157/76     Weight: 55 kg (121 lb 4.1 oz)  Body mass index is 17.91 kg/m².    Intake/Output Summary (Last 24 hours) at 9/13/2018 2149  Last data filed at 9/13/2018 1510  Gross per 24 hour   Intake 1925 ml   Output --   Net 1925 ml      Physical Exam   Constitutional: She is oriented to person, place, and time. She appears well-developed.   Thin female   HENT:   Head: Normocephalic and atraumatic.   Right Ear: External ear normal.   Left Ear: External ear normal.   Mouth/Throat: Oropharynx is clear and moist.   Eyes: Conjunctivae and EOM are normal. Pupils are equal, round, and reactive to light.   Neck: Normal range of motion. Neck supple.   Trach in place. No erythema noted to site.   Cardiovascular: Normal rate, regular rhythm, normal heart sounds and intact distal pulses.   Pulmonary/Chest: Effort normal. No respiratory distress. She has no wheezes.   Trach to vent.  Rhonchi at base. No wheezes noted   Abdominal: Soft. Bowel sounds are normal.   Peg intact to upper abdomen. No erythema to site.   + mild epigastric tenderness with palpation   Musculoskeletal: She exhibits no tenderness.   Quadriplegia. non tender to joints    Neurological: She is alert and oriented to person, place, and time. She displays abnormal reflex. A sensory deficit is present. Coordination abnormal.   Answers questions appropriately, mouths words.  Diffuse muscle weakness to all groups 2/5.    Skin: Skin is warm and dry. No erythema.   Sacral pressure ulcer. Stage 3. See nursing notes  Heel protectors in place.    Psychiatric: She has a normal mood and affect. Her behavior is normal. Judgment normal.   Nursing note and vitals reviewed.      Significant Labs: All pertinent labs within the past 24 hours have been reviewed.    Significant Imaging: I have reviewed all pertinent imaging results/findings within the past 24 hours.

## 2018-09-14 NOTE — CONSULTS
09/14/2018      Admit Date: 9/12/2018  Retavasquez Josephkins  New Patient Consult    Chief Complaint   Patient presents with    Fever     family reports 100.4    Chest Pain       History of Present Illness:  Pt with cipd last 3 yrs, in March had vc 37% or 1 liter, presented to [facility] 3months ago with hypercapnia, got trach, spent time at [facility], and has been at home on vent via 6 fenestrated Shiley.  She is not clear on prior trach changes save she was downsized at [facility].  Pt speaks both with pm valve and with cuff leak- she prefers cuff leak.  She feels constant rattle.  Pt is non ambulatory, upper extremities cannot be raised.  She has good voice, she is not clear if she aspirates, has been npo and sustained with peg.  She has good appetite and would like to eat.  No prior resp dz, uses resp rx.        Pt admitted 9/12 with pneumonia- improved with 2 days abx.  No culture.  Her secretions usual beige/white.  She has daily inner cannula changes.  She has daughter, and numerous other care givers at home.      No anticoagg. ivig monthly.  To work on mobilization and wean at home?    PFSH:  Past Medical History:   Diagnosis Date    Arthritis     CIDP (chronic inflammatory demyelinating polyneuropathy)     Hypertension     Hypoparathyroidism     Dr. Rahman    Thyroid disease      Past Surgical History:   Procedure Laterality Date    ESOPHAGOGASTRODUODENOSCOPY (EGD) N/A 12/20/2016    Performed by [facility] at [facility] ENDO    parathyroid removal      PARATHYROIDECTOMY Bilateral 08/2017    PEG W/TRACHEOSTOMY PLACEMENT      TUBAL LIGATION       Social History     Tobacco Use    Smoking status: Never Smoker    Smokeless tobacco: Never Used   Substance Use Topics    Alcohol use: No    Drug use: No     Family History   Problem Relation Age of Onset    Hypertension Father     Stomach cancer Father     Diabetes Father     Diabetes Maternal Grandmother     Hypertension Maternal Grandmother     Stomach cancer  "Maternal Aunt     Stomach cancer Paternal Aunt     Breast cancer Paternal Aunt     Parkinsonism Paternal Aunt     Heart disease Mother      Review of patient's allergies indicates:  No Known Allergies    Performance Status:Performance Status:The patient's activity level is bedbound.    Review of Systems:  a review of eleven systems covering constitutional, Psych, Eye, HEENT, Respiratory, Cardiac, GI, , Musculoskeletal, Endocrine, Dermatologicwas negative except the above mentioned abnormalities and for any pertinent findings as listed below:  pertinent positive as above, rest is good         Exam:Comprehensive exam done. /71   Pulse 68   Temp 98.2 °F (36.8 °C) (Oral)   Resp (!) 36   Ht 5' 9" (1.753 m)   Wt 53 kg (116 lb 13.5 oz)   SpO2 (!) 93%   Breastfeeding? No   BMI 17.25 kg/m²   Exam included Vitals as listed, and patient's appearance and affect and alertness and mood, oral exam for yeast and hygiene and pharynx lesions and Mallapatti (M) score, neck with inspection for jvd and masses and thyroid abnormalities and lymph nodes (supraclavicular and infraclavicular nodes also examined and noted if abn), chest exam included symmetry and effort and fremitus and percussion and auscultation, cardiac exam included rhythm and gallops and murmur and rubs and jvd and edema, abdominal exam for mass and hepatosplenomegaly and tenderness and hernias and bowel sounds, Musculoskeletal exam with muscle tone and posture and mobility/gait and  strenght, and skin for rashes and cyanosis and pallor and turgor, extremity for clubbing.  Findings were normal except as listed below:  6 shiley trach, vent, alert, good mood, min movement of poppy bilat, chest is symmetric, no distress, normal percussion, normal fremitus and bilat rattles, cough seems more effective with abd thrusting.  She has minimal abd muscle strength with ema kade manuver.  Cor nl, low muscle mass, good voice, good oral hygiene, " M2,    Radiographs reviewed: view by direct vision - bilateral atelectasis with patchy infiltrates suggested.  Results for orders placed during the hospital encounter of 09/12/18   X-Ray Chest 1 View    Narrative EXAMINATION:  XR CHEST 1 VIEW    CLINICAL HISTORY:  CP, fever, r/o pna;    TECHNIQUE:  Single frontal view of the chest was performed.    COMPARISON:  3/6/2018    FINDINGS:  The interval insertion of a tracheostomy cannula which as visualized in the single AP projection appears in good position.  The cardiomediastinal silhouette is stable.  There is no pleural effusion on the right.  There could be very small left pleural effusion contributing to left basilar opacification    There are bibasilar infiltrates left slightly more so than right.  Basilar location questions aspiration pneumonia.      Impression New bibasilar infiltrates consistent with pneumonia.  Basilar location questions aspiration.  Tracheostomy cannula present.      Electronically signed by: Sylvie Marroquin MD  Date:    09/12/2018  Time:    12:48   ]    Labs     Recent Labs   Lab  09/14/18   0517   WBC  14.00*   HGB  9.5*   HCT  28.6*   PLT  422*     Recent Labs   Lab  09/14/18   0517   NA  140   K  4.7   CL  110   CO2  22*   BUN  11   CREATININE  0.4*   GLU  96   CALCIUM  9.1   MG  2.0   PROCAL  0.10   No results for input(s): PH, PCO2, PO2, HCO3 in the last 24 hours.  Microbiology Results (last 7 days)     Procedure Component Value Units Date/Time    Blood culture [[id number]] Collected:  09/12/18 1416    Order Status:  Completed Specimen:  Blood from Peripheral, Right  Hand Updated:  09/14/18 1026     Blood Culture, Routine Gram stain paresh bottle: Gram positive cocci in clusters resembling Staph     Blood Culture, Routine Results called to and read back by:Aliya Solano RN 09/13/2018  17:11     Blood Culture, Routine Gram stain aer bottle: Gram positive cocci in clusters resembling Staph      Blood Culture, Routine 09/13/2018  20:01      Blood Culture, Routine --     COAGULASE-NEGATIVE STAPHYLOCOCCUS SPECIES  Organism is a probable contaminant      Blood culture [[id number]] Collected:  09/12/18 1423    Order Status:  Completed Specimen:  Blood Updated:  09/13/18 2222     Blood Culture, Routine No Growth to date     Blood Culture, Routine No Growth to date    Culture, Respiratory with Gram Stain [[id number]]     Order Status:  No result Specimen:  Respiratory from Sputum, Induced     Culture, Respiratory with Gram Stain [[id number]]     Order Status:  Canceled Specimen:  Respiratory from Sputum, Induced           Impression:  Active Hospital Problems    Diagnosis  POA    *VAP (ventilator-associated pneumonia) [J95.851]  Yes    Severe malnutrition [E43]  Yes    CIDP (chronic inflammatory demyelinating polyneuropathy) [G61.81]  Yes    Tracheostomy status [Z93.0]  Not Applicable    PEG (percutaneous endoscopic gastrostomy) status [Z93.1]  Not Applicable    Hypokalemia [E87.6]  Yes    Pressure ulcer of sacral region [L89.159]  Yes    Chronic respiratory failure with hypoxia [J96.11]  Yes    Quadriplegia [G82.50]  Yes    Acute gastritis without hemorrhage [K29.00]  Yes    Iron deficiency anemia secondary to inadequate dietary iron intake [D50.8]  Yes    Acquired hypothyroidism [E03.9]  Yes     Dr. Rahman      Hypertension [I10]  Yes      Resolved Hospital Problems   No resolved problems to display.       Plan:   procalcitionin was 0.10, wbc up and cxr infiltrates/atelectasis.  Pt expected to have inadequate clearence secretions- cough assist needed.  Will do trach change.  Would recommend speech eval and po intake.  outpt reasonable.        Pt on ivig monthly for cidp.      Would rec trach changes monthly.      Procedure - trach change with daughter at bedside with no problem - 6 fenestrated Shiley done.  No complication,  ebl 5 cc.

## 2018-09-14 NOTE — ASSESSMENT & PLAN NOTE
Patient's anemia is currently controlled. S/p 0 units of PRBCs. Etiology likely d/t BERHANE  Current CBC reviewed-   Lab Results   Component Value Date    HGB 9.0 (L) 09/13/2018    HCT 27.1 (L) 09/13/2018     Monitor serial CBC and transfuse if patient becomes hemodynamically unstable, symptomatic or H/H drops below 7/21.

## 2018-09-14 NOTE — ASSESSMENT & PLAN NOTE
Patient without risk factors for Pseudomonas and MRSA secondary to not exposure to medical facilities and no evidence of structural lung disease.  Will deescalate antibiotics to Rocephin and azithro and follow up with procalcitonin in the morning.  Continue to monitor respiratory status and clinical status closely.

## 2018-09-14 NOTE — PROGRESS NOTES
[facility] called for transport of patient home.   Discharge instructions reviewed with  and patient, prescribed medications discussed,  to  from pharmacy.

## 2018-09-14 NOTE — PLAN OF CARE
09/14/18 0731   Patient Assessment/Suction   Level of Consciousness (AVPU) alert   Respiratory Effort Normal;Unlabored   Expansion/Accessory Muscles/Retractions no retractions;no use of accessory muscles   All Lung Fields Breath Sounds coarse   Rhythm/Pattern, Respiratory ventilator assisted   Cough Frequency with stimulation   Cough Type good   Suction Method in-line suction catheter (closed)   $ Suction Charges Inline Suction Procedure Stat Charge   Sputum Amount small;scant   Sputum Color white   Sputum Consistency thick   PRE-TX-O2-ETCO2   O2 Device (Oxygen Therapy) ventilator   $ Is the patient on Low Flow Oxygen? Yes   Oxygen Concentration (%) 21   SpO2 98 %   Pulse Oximetry Type Continuous   Pulse 68   Resp (!) 22   /66   Aerosol Therapy   $ Aerosol Therapy Charges Aerosol Treatment   Respiratory Treatment Status given   SVN/Inhaler Treatment Route in-line   Position During Treatment HOB at 45 degrees   Patient Tolerance good   Post-Treatment   Post-treatment Heart Rate (beats/min) 69   Post-treatment Resp Rate (breaths/min) 20   All Fields Breath Sounds aeration increased       Surgical Airway Shiley Fenestrated   No Placement Date or Time found.   Present Prior to Hospital Arrival?: Yes  Type: Tracheostomy  Brand: Shiley  Airway Device Size: 6.0  Style: Fenestrated   Cuff Pressure 30 cm H2O   Cuff Inflation? Inflated   Status Secured   Site Assessment Clean;Dry   Ties Assessment Clean;Dry;Intact   Respiratory Interventions   Airway/Ventilation Management airway patency maintained   Respiratory Support HOB elevated   Vent Select   $ Ventilator Subsequent 1   Charged w/in last 24h YES   IHI Ventilator Associated Pneumonia Bundle   Head of Bed Elevated  HOB 45   Preset Conventional Ventilator Settings   Vent Type    Ventilation Type VC   Vent Mode A/C   Humidity HME   Set Rate 14 bmp   Vt Set 500 mL   PEEP/CPAP 5 cmH20   Pressure Support 0 cmH20   Waveform RAMP   Peak Flow 65 L/min   Set  Inspiratory Pressure 0 cmH20   Insp Time 0 Sec(s)   Plateau Set/Insp. Hold (sec) 0   Insp Rise Time  0 %   Trigger Sensitivity Flow/I-Trigger 2 L/min   P High 0 cm H2O   P Low 0 cm H2O   T High 0 sec   T Low 0 sec   Patient Ventilator Parameters   Resp Rate Total 26 br/min   Peak Airway Pressure 34 cmH2O   Mean Airway Pressure 13 cmH20   Plateau Pressure 0 cmH20   Exhaled Vt 869 mL   Total Ve 14.7 mL   Spont Ve 0 L   I:E Ratio Measured 1:1.70   Conventional Ventilator Alarms   Ve High Alarm 27 L/min   Resp Rate High Alarm 45 br/min   Press High Alarm 100 cmH2O   Apnea Rate 10   Apnea Volume (mL) 620 mL   Apnea Oxygen Concentration  100   Apnea Flow Rate (L/min) 74   T Apnea 20 sec(s)   Ready to Wean/Extubation Screen   FIO2<=50 (chart decimal) 0.21   MV<16L (chart vol.) 14.7   PEEP <=8 (chart #) 5   Ready to Wean Parameters   F/VT Ratio<105 (RSBI) (!) 25.32

## 2018-09-14 NOTE — HOSPITAL COURSE
Patient is a 58-year-old  female with a past medical history significant for chronic inflammatory demyelinating polyneuropathy and respiratory failure secondary to neuromuscular weakness with indwelling trach and PEG who presents the hospital with worsening shortness of breath and fatigue.  Patient was found initially to have evidence of bibasilar infiltrates consistent with bilateral pneumonia.  She was started initially on broad-spectrum antibiotics and de-escalated according to guidelines for community-acquired pneumonia.  Patient improved clinically and was back to her clinical baseline at approximately hospital day 2.  Patient had her tracheostomy cannula changed by pulmonology while in the hospital and was discharged home with cefdinir and azithromycin to complete out a 5 day course of antibiotics.  Patient was seen and examined on the day of discharge and deemed medically stable for discharge home.  She was set up for home health, with PT/OT/speech as well as home health aide and cough assist.  She will need to follow up with her primary care provider in the next week to 2 weeks.

## 2018-09-14 NOTE — PT/OT/SLP EVAL
"Speech Language Pathology Evaluation  Bedside Swallow    Patient Name:  Reta Styles   MRN:  [id number]  Admitting Diagnosis: VAP (ventilator-associated pneumonia)    Recommendations:                 General Recommendations:  Modified barium swallow study  Diet recommendations:  NPO, NPO   Communication strategies:  All cuff leak    History:     Past Medical History:   Diagnosis Date    Arthritis     CIDP (chronic inflammatory demyelinating polyneuropathy)     Hypertension     Hypoparathyroidism     Dr. Rahman    Thyroid disease        Past Surgical History:   Procedure Laterality Date    ESOPHAGOGASTRODUODENOSCOPY (EGD) N/A 12/20/2016    Performed by [facility]avez [facility] at [facility] ENDO    parathyroid removal      PARATHYROIDECTOMY Bilateral 08/2017    PEG W/TRACHEOSTOMY PLACEMENT      TUBAL LIGATION         Social History: Patient lives with family.    Modified Barium Swallow: none    Chest X-Rays: "New bibasilar infiltrates consistent with pneumonia.  Basilar location questions aspiration.  Tracheostomy cannula present"    Prior diet: PEG    Occupation/hobbies/homemaking: [employer].    Subjective     "I just want to drink."    Pain/Comfort:  · Pain Rating Post-Intervention 1: 0/10    Objective:   Pt seen in ICU for clinical swallowing evaluation. Trach/vent DEP since June/July 2018 per pt. She is alert and cooperative. Answers questions appropriately but requires cues to decreased ROS; she is mostly aphonic. Able to achieve phonation with encouragement; strained vocal quality. Pt given thin liquids via 1/2 tsp and tsp and nectar thick liquids via 1/2 tsp and tsp. Multiple spontaneous swallows noted with each trial.    Oral Musculature Evaluation  · Oral Musculature: WFL  · Dentition: present and adequate  · Secretion Management: adequate  · Mucosal Quality: adequate  · Mandibular Strength and Mobility: WFL  · Oral Labial Strength and Mobility: WFL  · Lingual Strength and Mobility: WFL  · Buccal Strength " and Mobility: WFL  · Volitional Cough: weak and ineffective  · Volitional Swallow: able to palpate laryngeal movement  · Voice Prior to PO Intake: strained vocal quality; mostly aphonic    Bedside Swallow Eval:   Consistencies Assessed:  · Thin liquids ice chip, thin liquids via 1/2 tsp and tspx1  · Nectar thick liquids via 1/2 tsp and tspx1     Oral Phase:   · WFL    Pharyngeal Phase:   · multiple spontaneous swallows    Compensatory Strategies  · None    Treatment: Pt/family educated re: results/recs of evaluation, SLP role and need for MBSS for further evaluation of swallow function. Receptive to information provided.     Assessment:     Reta Styles is a 58 y.o. female with an SLP diagnosis of Dysphagia.  She presents with suspected pharyngeal dysphagia. REC MBSS for further evaluation of swallow function.     Plan:     · Patient to be seen:      · Plan of Care expires:     · Plan of Care reviewed with:  patient   · SLP Follow-Up:  Yes(MBSS)        Time Tracking:     SLP Treatment Date:   09/14/18  Speech Start Time:  1312  Speech Stop Time:  1328     Speech Total Time (min):  16 min    Billable Minutes: Eval Swallow and Oral Function 16 and Total Time 16    Danyelle Carolina CCC-SLP  09/14/2018

## 2018-09-15 LAB
BACTERIA BLD CULT: NORMAL

## 2018-09-15 NOTE — DISCHARGE SUMMARY
"[facility] Hospital Medicine  Discharge Summary      Patient Name: Reta Styles  MRN: [id number]  Admission Date: 9/12/2018  Hospital Length of Stay: 2 days  Discharge Date and Time: 9/14/2018  8:00 PM  Attending Physician: No att. providers found   Discharging Provider: Sahil Wick MD  Primary Care Provider: Anish Shay MD      HPI:   Reta Styles is a 58 year old female with a past medical history of CIPD, quadraplegia with trach and peg, HTN, and thyroid disease that presents to the ED with complaints of chest pain and a fever. Patient has a home ventilator due to her CIPD and is currently on her home settings and denies any shortness of breath. Pt's sister states the fever began a couple days ago, with a max temp of 100.4, which was treated with Tylenol and temporarily improved. Pt states the chest pain began this morning and describes it as a "burning" pain in the epigastric region that radiates to the mid sternal area. She denies any nausea, vomiting, or diaphoresis.      WBC count elevated and chest x-ray shows bilateral infiltrates that are consistent with pneumonia.         * No surgery found *      Hospital Course:   Patient is a 58-year-old  female with a past medical history significant for chronic inflammatory demyelinating polyneuropathy and respiratory failure secondary to neuromuscular weakness with indwelling trach and PEG who presents the hospital with worsening shortness of breath and fatigue.  Patient was found initially to have evidence of bibasilar infiltrates consistent with bilateral pneumonia.  She was started initially on broad-spectrum antibiotics and de-escalated according to guidelines for community-acquired pneumonia.  Patient improved clinically and was back to her clinical baseline at approximately hospital day 2.  Patient had her tracheostomy cannula changed by pulmonology while in the hospital and was discharged home with cefdinir " and azithromycin to complete out a 5 day course of antibiotics.  Patient was seen and examined on the day of discharge and deemed medically stable for discharge home.  She was set up for home health, with PT/OT/speech as well as home health aide and cough assist.  She will need to follow up with her primary care provider in the next week to 2 weeks.     Consults:   Consults (From admission, onward)        Status Ordering Provider     Inpatient consult to ENT  Once     Provider:  Logan Vasquez MD    Acknowledged MOO PADILLA     Inpatient consult to Pulmonology  Once     Provider:  Pravin Brown MD    Completed MOO PADILLA     Inpatient consult to Social Work/Case Management  Once     Provider:  (Not yet assigned)    Acknowledged PRAVIN BROWN     IP consult to case management  Once     Provider:  (Not yet assigned)    Acknowledged MOO PADILLA     IP consult to dietary  Once     Provider:  (Not yet assigned)    Completed LULU WONG          No new Assessment & Plan notes have been filed under this hospital service since the last note was generated.  Service: Hospital Medicine    Final Active Diagnoses:    Diagnosis Date Noted POA    PRINCIPAL PROBLEM:  VAP (ventilator-associated pneumonia) [J95.851] 09/12/2018 Yes    Chronic respiratory failure with hypercapnia [J96.12] 09/12/2018 Yes    Tracheostomy status [Z93.0] 09/12/2018 Not Applicable    CIDP (chronic inflammatory demyelinating polyneuropathy) [G61.81] 09/12/2018 Yes    Atelectasis [J98.11] 09/14/2018 Yes    Severe malnutrition [E43] 09/13/2018 Yes    PEG (percutaneous endoscopic gastrostomy) status [Z93.1] 09/12/2018 Not Applicable    Hypokalemia [E87.6] 09/12/2018 Yes    Pressure ulcer of sacral region [L89.159] 09/12/2018 Yes    Quadriplegia [G82.50] 09/12/2018 Yes    Acute gastritis without hemorrhage [K29.00] 09/12/2018 Yes    Iron deficiency anemia secondary to inadequate dietary iron intake [D50.8] 09/12/2018 Yes     Acquired hypothyroidism [E03.9] 04/05/2016 Yes    Hypertension [I10] 05/02/2013 Yes      Problems Resolved During this Admission:       Discharged Condition: stable    Disposition: Home-Health Care [facility]    Follow Up:  Follow-up Information     Anish Shay MD In 2 weeks.    Specialties:  Pulmonary Disease, Internal Medicine  Why:  office closed, family to call for appointment on Monday  Contact information:  [address] [place] [id number]  [phone]                 Patient Instructions:      Referral to Home health   Referral Priority: Routine Referral Type: Home Health Care   Referral Reason: Specialty Services Required   Requested Specialty: Home Health Services   Number of Visits Requested: 1     Notify your health care provider if you experience any of the following:  temperature >100.4     Notify your health care provider if you experience any of the following:  difficulty breathing or increased cough     Notify your health care provider if you experience any of the following:  increased confusion or weakness     No dressing needed     Activity as tolerated       Significant Diagnostic Studies:     Pending Diagnostic Studies:     Procedure Component Value Units Date/Time    Fl Modified Barium Swallow Speech [[id number]] Resulted:  09/14/18 1551    Order Status:  Sent Lab Status:  In process Updated:  09/14/18 2140         Medications:  Reconciled Home Medications:      Medication List      START taking these medications    azithromycin 500 MG tablet  Commonly known as:  ZITHROMAX  Take 1 tablet (500 mg total) by mouth once daily. for 3 days     cefdinir 250 mg/5 mL suspension  Commonly known as:  OMNICEF  Take 5 mLs (250 mg total) by mouth every 12 (twelve) hours. for 5 days        CHANGE how you take these medications    cyanocobalamin 1,000 mcg/mL injection  Inject 1 mL (1,000 mcg total) into the skin As instructed. 1000 mcg (1 ml) subcutaneous injection daily x 5 days, then weekly x 4 weeks,  then monthly thereafter  What changed:    · when to take this  · additional instructions        CONTINUE taking these medications    albuterol 0.63 mg/3 mL Nebu  Commonly known as:  ACCUNEB  Take 0.63 mg by nebulization every 6 (six) hours as needed. Rescue     ALPRAZolam 0.25 MG tablet  Commonly known as:  XANAX  Take 0.25 mg by mouth 2 (two) times daily as needed for Anxiety.     amLODIPine 10 MG tablet  Commonly known as:  NORVASC  Take 10 mg by mouth once daily.     calcitRIOL 0.25 MCG Cap  Commonly known as:  ROCALTROL  Take 0.25 mcg by mouth 2 (two) times daily.     citalopram 20 MG tablet  Commonly known as:  CELEXA  Take 20 mg by mouth once daily.     famotidine 20 MG tablet  Commonly known as:  PEPCID  Take 20 mg by mouth 2 (two) times daily.     ipratropium 0.02 % nebulizer solution  Commonly known as:  ATROVENT  Take 500 mcg by nebulization 4 (four) times daily. Rescue     metoprolol tartrate 25 MG tablet  Commonly known as:  LOPRESSOR  Take 25 mg by mouth 2 (two) times daily.     predniSONE 20 MG tablet  Commonly known as:  DELTASONE  Take 40 mg by mouth once daily.     SYNTHROID 175 MCG tablet  Generic drug:  levothyroxine  Take 175 mcg by mouth once daily.            Indwelling Lines/Drains at time of discharge:   Lines/Drains/Airways     Drain                 Gastrostomy/Enterostomy LUQ feeding -- days          Airway                 Surgical Airway Shiley Fenestrated -- days                Time spent on the discharge of patient: 38 minutes  Patient was seen and examined on the date of discharge and determined to be suitable for discharge.        Sahil Wick MD  Department of Hospital Medicine  [facility]

## 2018-09-15 NOTE — H&P
Pt found to be AAO, without complaints; vent remains on same settings; color good; denies pain. Suctioned via trach with min secretions noted, RT called;

## 2018-09-15 NOTE — PLAN OF CARE
09/14/18 1917   Patient Assessment/Suction   Level of Consciousness (AVPU) alert   Respiratory Effort Normal;Unlabored   Expansion/Accessory Muscles/Retractions expansion symmetric   All Lung Fields Breath Sounds coarse   Rhythm/Pattern, Respiratory ventilator assisted   Cough Frequency with stimulation   Cough Type good   Suction Method in-line suction catheter (closed)   $ Suction Charges Inline Suction Procedure Stat Charge   Sputum Amount moderate   Sputum Color white-yellow   Sputum Consistency thick   [id number]   O2 Device (Oxygen Therapy) ventilator   Oxygen Concentration (%) 21   SpO2 (!) 92 %   Pulse Oximetry Type Continuous   Pulse 70   Resp 17   Aerosol Therapy   $ Aerosol Therapy Charges Aerosol Treatment   Respiratory Treatment Status given   SVN/Inhaler Treatment Route in-line   Position During Treatment HOB at 30 degrees   Patient Tolerance good   Post-Treatment   Post-treatment Heart Rate (beats/min) 64   Post-treatment Resp Rate (breaths/min) 16   All Fields Breath Sounds aeration increased       Surgical Airway Shiley Fenestrated   No Placement Date or Time found.   Present Prior to Hospital Arrival?: Yes  Type: Tracheostomy  Brand: Aligned TeleHealthley  Airway Device Size: 6.0  Style: Fenestrated   Cuff Pressure 32 cm H2O   Cuff Inflation? Inflated   Status Secured   Site Assessment Clean   Ties Assessment Clean   Vent Select   Conventional Vent Y   Charged w/in last 24h YES   Preset Conventional Ventilator Settings   Vent Type    Ventilation Type VC   Vent Mode A/C   Humidity HME   Set Rate 14 bmp   Vt Set 500 mL   PEEP/CPAP 10 cmH20   Pressure Support 0 cmH20   Waveform RAMP   Peak Flow 65 L/min   Set Inspiratory Pressure 0 cmH20   Insp Time 0 Sec(s)   Plateau Set/Insp. Hold (sec) 0   Insp Rise Time  0 %   Trigger Sensitivity Flow/I-Trigger 2 L/min   P High 0 cm H2O   P Low 0 cm H2O   T High 0 sec   T Low 0 sec   Patient Ventilator Parameters   Resp Rate Total 17 br/min   Peak Airway Pressure 63  cmH2O   Mean Airway Pressure 20 cmH20   Plateau Pressure 0 cmH20   Exhaled Vt 658 mL   Total Ve 8.6 mL   Spont Ve 0 L   I:E Ratio Measured 1:2.70   Conventional Ventilator Alarms   Ve High Alarm 27 L/min   Resp Rate High Alarm 45 br/min   Press High Alarm 100 cmH2O   Apnea Rate 10   Apnea Volume (mL) 620 mL   Apnea Oxygen Concentration  100   Apnea Flow Rate (L/min) 74   T Apnea 20 sec(s)   Ready to Wean/Extubation Screen   FIO2<=50 (chart decimal) 0.21   MV<16L (chart vol.) 8.6   PEEP <=8 (chart #) (!) 10   Ready to Wean Parameters   F/VT Ratio<105 (RSBI) (!) 25.84

## 2018-09-15 NOTE — NURSING
IV removed; NGT flushed and clamped; ambulance service here and transported to home with vent in place

## 2018-09-17 ENCOUNTER — TELEPHONE (OUTPATIENT)
Dept: NEUROLOGY | Facility: CLINIC | Age: 58
End: 2018-09-17

## 2018-09-17 LAB — BACTERIA BLD CULT: NORMAL

## 2018-09-17 NOTE — TELEPHONE ENCOUNTER
----- Message from Darnell Fisher sent at 9/17/2018  2:20 PM CDT -----  Contact: Hardik ( daughter ) @ [phone]  Caller is calling to get an update on the IVIG at home treatment, pls call to advise

## 2018-09-18 ENCOUNTER — TELEPHONE (OUTPATIENT)
Dept: NEUROLOGY | Facility: CLINIC | Age: 58
End: 2018-09-18

## 2018-09-18 NOTE — TELEPHONE ENCOUNTER
Spoke with Samia at [facility] regarding mediation shipment date. Pharmacist indicated that she is waiting on nurse to confirm with her the dates of infusion. IVIG tentatively scheduled Mon, Tues, Wed of next week; therefore, medication shipped on this Thursday, 9/20. Samia to f/u with patient's daughter regarding date and times. Hardik informed.

## 2018-12-20 PROBLEM — R26.9 GAIT ABNORMALITY: Status: ACTIVE | Noted: 2018-12-20

## 2023-08-15 NOTE — ED NOTES
"Pt presents to ED via [facility] with c/o MS chest "burning", with a low grade temp. Pt arrived ventilated with trach collar. VSS. Pt in NAD.   "
Bed: 15  Expected date:   Expected time:   Means of arrival:   Comments:  VENT  
Unable to flush PEG for med administration. Tylenol held. Will attempt later.   
[FreeTextEntry1] : Likely with hematoma to right second toe recommend patient ice the area, can take Ibuprofen for pain control monitor for worsening signs and/or symptoms   HTN- BP improved upon recheck, c/w Valsartan, will recheck BP at upcoming office visit